# Patient Record
Sex: MALE | Race: WHITE | NOT HISPANIC OR LATINO | Employment: OTHER | URBAN - METROPOLITAN AREA
[De-identification: names, ages, dates, MRNs, and addresses within clinical notes are randomized per-mention and may not be internally consistent; named-entity substitution may affect disease eponyms.]

---

## 2017-01-17 PROBLEM — Z96.641 STATUS POST TOTAL REPLACEMENT OF RIGHT HIP: Status: ACTIVE | Noted: 2017-01-17

## 2017-02-10 PROBLEM — M25.552 LEFT HIP PAIN: Status: ACTIVE | Noted: 2017-02-10

## 2017-02-10 PROBLEM — M16.12 PRIMARY OSTEOARTHRITIS OF LEFT HIP: Status: ACTIVE | Noted: 2017-02-10

## 2017-02-10 PROBLEM — M47.817 SPONDYLOSIS OF LUMBOSACRAL REGION WITHOUT MYELOPATHY OR RADICULOPATHY: Status: ACTIVE | Noted: 2017-02-10

## 2017-03-30 PROBLEM — E11.9 DIABETES MELLITUS WITHOUT OPHTHALMIC MANIFESTATIONS (HCC): Status: ACTIVE | Noted: 2017-03-30

## 2018-06-13 PROBLEM — Z98.890 S/P CORONARY ANGIOGRAM: Status: ACTIVE | Noted: 2018-06-13

## 2018-09-26 PROBLEM — C44.91 BASAL CELL CARCINOMA, UNSPECIFIED SITE: Status: ACTIVE | Noted: 2018-09-26

## 2019-05-03 PROBLEM — C44.91 BASAL CELL CARCINOMA, UNSPECIFIED SITE: Status: RESOLVED | Noted: 2018-09-26 | Resolved: 2019-05-03

## 2019-11-08 PROBLEM — Z12.5 SCREENING FOR PROSTATE CANCER: Status: ACTIVE | Noted: 2019-11-08

## 2020-02-07 PROBLEM — Z86.73 HISTORY OF CVA (CEREBROVASCULAR ACCIDENT): Status: ACTIVE | Noted: 2020-02-07

## 2020-05-08 PROBLEM — F32.0 CURRENT MILD EPISODE OF MAJOR DEPRESSIVE DISORDER WITHOUT PRIOR EPISODE (HCC): Status: ACTIVE | Noted: 2020-05-08

## 2020-10-14 PROBLEM — T84.84XA PAINFUL TOTAL KNEE REPLACEMENT, LEFT: Status: ACTIVE | Noted: 2020-10-14

## 2020-10-14 PROBLEM — Z96.652 PAINFUL TOTAL KNEE REPLACEMENT, LEFT: Status: ACTIVE | Noted: 2020-10-14

## 2020-10-14 PROBLEM — T84.84XA PAINFUL TOTAL KNEE REPLACEMENT, LEFT (HCC): Status: ACTIVE | Noted: 2020-10-14

## 2020-10-14 PROBLEM — Z96.652 PAINFUL TOTAL KNEE REPLACEMENT, LEFT (HCC): Status: ACTIVE | Noted: 2020-10-14

## 2021-02-22 PROBLEM — I69.359 HEMIPLEGIA, DOMINANT SIDE S/P CVA (CEREBROVASCULAR ACCIDENT) (HCC): Status: ACTIVE | Noted: 2021-02-22

## 2021-06-28 ENCOUNTER — APPOINTMENT (OUTPATIENT)
Dept: CT IMAGING | Age: 76
DRG: 175 | End: 2021-06-28
Attending: EMERGENCY MEDICINE

## 2021-06-28 ENCOUNTER — APPOINTMENT (OUTPATIENT)
Dept: GENERAL RADIOLOGY | Age: 76
DRG: 175 | End: 2021-06-28
Attending: EMERGENCY MEDICINE

## 2021-06-28 ENCOUNTER — HOSPITAL ENCOUNTER (INPATIENT)
Age: 76
LOS: 1 days | Discharge: HOME | DRG: 175 | End: 2021-06-29
Attending: EMERGENCY MEDICINE | Admitting: HOSPITALIST

## 2021-06-28 DIAGNOSIS — R07.89 LEFT-SIDED CHEST WALL PAIN: ICD-10-CM

## 2021-06-28 DIAGNOSIS — I26.99 BILATERAL PULMONARY EMBOLISM (CMD): ICD-10-CM

## 2021-06-28 DIAGNOSIS — I48.91 NEW ONSET ATRIAL FIBRILLATION (CMD): ICD-10-CM

## 2021-06-28 DIAGNOSIS — I26.99 PULMONARY EMBOLISM AND INFARCTION (CMD): ICD-10-CM

## 2021-06-28 DIAGNOSIS — J18.9 PNEUMONIA OF RIGHT MIDDLE LOBE DUE TO INFECTIOUS ORGANISM: ICD-10-CM

## 2021-06-28 DIAGNOSIS — R07.81 PLEURITIC CHEST PAIN: Primary | ICD-10-CM

## 2021-06-28 PROBLEM — Z96.641 STATUS POST TOTAL REPLACEMENT OF RIGHT HIP: Status: ACTIVE | Noted: 2017-01-17

## 2021-06-28 PROBLEM — F32.0 CURRENT MILD EPISODE OF MAJOR DEPRESSIVE DISORDER WITHOUT PRIOR EPISODE (CMD): Status: ACTIVE | Noted: 2020-05-08

## 2021-06-28 PROBLEM — Z86.73 HISTORY OF CVA (CEREBROVASCULAR ACCIDENT): Status: ACTIVE | Noted: 2020-02-07

## 2021-06-28 PROBLEM — Z98.890 S/P CORONARY ANGIOGRAM: Status: ACTIVE | Noted: 2018-06-13

## 2021-06-28 PROBLEM — Z96.652: Status: ACTIVE | Noted: 2020-10-14

## 2021-06-28 PROBLEM — Z12.5 SCREENING FOR PROSTATE CANCER: Status: ACTIVE | Noted: 2019-11-08

## 2021-06-28 PROBLEM — I69.359 HEMIPLEGIA, DOMINANT SIDE S/P CVA (CEREBROVASCULAR ACCIDENT) (CMD): Status: ACTIVE | Noted: 2021-02-22

## 2021-06-28 PROBLEM — M25.552 LEFT HIP PAIN: Status: ACTIVE | Noted: 2017-02-10

## 2021-06-28 PROBLEM — T84.84XA: Status: ACTIVE | Noted: 2020-10-14

## 2021-06-28 LAB
ALBUMIN SERPL-MCNC: 3.1 G/DL (ref 3.6–5.1)
ALBUMIN/GLOB SERPL: 0.8 {RATIO} (ref 1–2.4)
ALP SERPL-CCNC: 92 UNITS/L (ref 45–117)
ALT SERPL-CCNC: 30 UNITS/L
ANION GAP SERPL CALC-SCNC: 8 MMOL/L (ref 10–20)
APTT PPP: 114 SEC (ref 22–30)
AST SERPL-CCNC: 15 UNITS/L
ATRIAL RATE (BPM): 104
BASOPHILS # BLD: 0.1 K/MCL (ref 0–0.3)
BASOPHILS NFR BLD: 1 %
BILIRUB SERPL-MCNC: 1 MG/DL (ref 0.2–1)
BUN SERPL-MCNC: 16 MG/DL (ref 6–20)
BUN/CREAT SERPL: 22 (ref 7–25)
CALCIUM SERPL-MCNC: 8.9 MG/DL (ref 8.4–10.2)
CHLORIDE SERPL-SCNC: 112 MMOL/L (ref 98–107)
CO2 SERPL-SCNC: 26 MMOL/L (ref 21–32)
CREAT SERPL-MCNC: 0.74 MG/DL (ref 0.67–1.17)
D DIMER PPP FEU-MCNC: 2.71 MG/L (FEU)
DEPRECATED RDW RBC: 45.3 FL (ref 39–50)
EOSINOPHIL # BLD: 0.2 K/MCL (ref 0–0.5)
EOSINOPHIL NFR BLD: 2 %
ERYTHROCYTE [DISTWIDTH] IN BLOOD: 13.3 % (ref 11–15)
FASTING DURATION TIME PATIENT: ABNORMAL H
GFR SERPLBLD BASED ON 1.73 SQ M-ARVRAT: 90 ML/MIN/1.73M2
GLOBULIN SER-MCNC: 3.7 G/DL (ref 2–4)
GLUCOSE SERPL-MCNC: 165 MG/DL (ref 65–99)
HCT VFR BLD CALC: 39 % (ref 39–51)
HGB BLD-MCNC: 13.2 G/DL (ref 13–17)
IMM GRANULOCYTES # BLD AUTO: 0 K/MCL (ref 0–0.2)
IMM GRANULOCYTES # BLD: 0 %
INR PPP: 1.1
LIPASE SERPL-CCNC: <50 UNITS/L (ref 73–393)
LYMPHOCYTES # BLD: 0.8 K/MCL (ref 1–4)
LYMPHOCYTES NFR BLD: 8 %
MAGNESIUM SERPL-MCNC: 1.6 MG/DL (ref 1.7–2.4)
MCH RBC QN AUTO: 30.9 PG (ref 26–34)
MCHC RBC AUTO-ENTMCNC: 33.8 G/DL (ref 32–36.5)
MCV RBC AUTO: 91.3 FL (ref 78–100)
MONOCYTES # BLD: 0.8 K/MCL (ref 0.3–0.9)
MONOCYTES NFR BLD: 8 %
NEUTROPHILS # BLD: 8.3 K/MCL (ref 1.8–7.7)
NEUTROPHILS NFR BLD: 81 %
NRBC BLD MANUAL-RTO: 0 /100 WBC
NT-PROBNP SERPL-MCNC: 910 PG/ML
PLATELET # BLD AUTO: 147 K/MCL (ref 140–450)
POTASSIUM SERPL-SCNC: 4.3 MMOL/L (ref 3.4–5.1)
PROCALCITONIN SERPL IA-MCNC: 0.1 NG/ML
PROT SERPL-MCNC: 6.8 G/DL (ref 6.4–8.2)
PROTHROMBIN TIME: 11.7 SEC (ref 9.7–11.8)
QRS-INTERVAL (MSEC): 134
QT-INTERVAL (MSEC): 372
QTC: 480
R AXIS (DEGREES): 36
RAINBOW EXTRA TUBES HOLD SPECIMEN: NORMAL
RBC # BLD: 4.27 MIL/MCL (ref 4.5–5.9)
REPORT TEXT: NORMAL
SODIUM SERPL-SCNC: 142 MMOL/L (ref 135–145)
T AXIS (DEGREES): 19
TROPONIN I SERPL HS-MCNC: <0.02 NG/ML
VENTRICULAR RATE EKG/MIN (BPM): 100
WBC # BLD: 10.1 K/MCL (ref 4.2–11)

## 2021-06-28 PROCEDURE — 10006031 HB ROOM CHARGE TELEMETRY

## 2021-06-28 PROCEDURE — 71045 X-RAY EXAM CHEST 1 VIEW: CPT

## 2021-06-28 PROCEDURE — 99285 EMERGENCY DEPT VISIT HI MDM: CPT

## 2021-06-28 PROCEDURE — 71275 CT ANGIOGRAPHY CHEST: CPT

## 2021-06-28 PROCEDURE — 83880 ASSAY OF NATRIURETIC PEPTIDE: CPT | Performed by: EMERGENCY MEDICINE

## 2021-06-28 PROCEDURE — 85025 COMPLETE CBC W/AUTO DIFF WBC: CPT | Performed by: EMERGENCY MEDICINE

## 2021-06-28 PROCEDURE — 87040 BLOOD CULTURE FOR BACTERIA: CPT | Performed by: EMERGENCY MEDICINE

## 2021-06-28 PROCEDURE — 83690 ASSAY OF LIPASE: CPT | Performed by: EMERGENCY MEDICINE

## 2021-06-28 PROCEDURE — 10002807 HB RX 258: Performed by: EMERGENCY MEDICINE

## 2021-06-28 PROCEDURE — 10002800 HB RX 250 W HCPCS: Performed by: EMERGENCY MEDICINE

## 2021-06-28 PROCEDURE — 96374 THER/PROPH/DIAG INJ IV PUSH: CPT

## 2021-06-28 PROCEDURE — 84484 ASSAY OF TROPONIN QUANT: CPT | Performed by: EMERGENCY MEDICINE

## 2021-06-28 PROCEDURE — 85730 THROMBOPLASTIN TIME PARTIAL: CPT | Performed by: EMERGENCY MEDICINE

## 2021-06-28 PROCEDURE — 93005 ELECTROCARDIOGRAM TRACING: CPT | Performed by: EMERGENCY MEDICINE

## 2021-06-28 PROCEDURE — 85610 PROTHROMBIN TIME: CPT | Performed by: EMERGENCY MEDICINE

## 2021-06-28 PROCEDURE — 10002805 HB CONTRAST AGENT: Performed by: EMERGENCY MEDICINE

## 2021-06-28 PROCEDURE — 83735 ASSAY OF MAGNESIUM: CPT | Performed by: EMERGENCY MEDICINE

## 2021-06-28 PROCEDURE — 96375 TX/PRO/DX INJ NEW DRUG ADDON: CPT

## 2021-06-28 PROCEDURE — 10002803 HB RX 637: Performed by: HOSPITALIST

## 2021-06-28 PROCEDURE — 10004651 HB RX, NO CHARGE ITEM: Performed by: HOSPITALIST

## 2021-06-28 PROCEDURE — 10002803 HB RX 637: Performed by: EMERGENCY MEDICINE

## 2021-06-28 PROCEDURE — 84145 PROCALCITONIN (PCT): CPT | Performed by: EMERGENCY MEDICINE

## 2021-06-28 PROCEDURE — 85379 FIBRIN DEGRADATION QUANT: CPT | Performed by: EMERGENCY MEDICINE

## 2021-06-28 PROCEDURE — 80053 COMPREHEN METABOLIC PANEL: CPT | Performed by: EMERGENCY MEDICINE

## 2021-06-28 RX ORDER — HEPARIN SODIUM 1000 [USP'U]/ML
80 INJECTION, SOLUTION INTRAVENOUS; SUBCUTANEOUS ONCE
Status: COMPLETED | OUTPATIENT
Start: 2021-06-28 | End: 2021-06-28

## 2021-06-28 RX ORDER — HEPARIN SODIUM 10000 [USP'U]/100ML
0-40 INJECTION, SOLUTION INTRAVENOUS CONTINUOUS
Status: DISCONTINUED | OUTPATIENT
Start: 2021-06-28 | End: 2021-06-29 | Stop reason: HOSPADM

## 2021-06-28 RX ORDER — PANTOPRAZOLE SODIUM 40 MG/1
40 TABLET, DELAYED RELEASE ORAL
Status: DISCONTINUED | OUTPATIENT
Start: 2021-06-29 | End: 2021-06-29 | Stop reason: HOSPADM

## 2021-06-28 RX ORDER — ONDANSETRON 2 MG/ML
4 INJECTION INTRAMUSCULAR; INTRAVENOUS ONCE
Status: COMPLETED | OUTPATIENT
Start: 2021-06-28 | End: 2021-06-28

## 2021-06-28 RX ORDER — ASPIRIN 81 MG/1
324 TABLET, CHEWABLE ORAL ONCE
Status: DISCONTINUED | OUTPATIENT
Start: 2021-06-28 | End: 2021-06-28 | Stop reason: CLARIF

## 2021-06-28 RX ORDER — ACETAMINOPHEN 325 MG/1
650 TABLET ORAL PRN
COMMUNITY
Start: 2019-12-03

## 2021-06-28 RX ORDER — MULTIVITAMIN,THER AND MINERALS
1 TABLET ORAL DAILY
COMMUNITY

## 2021-06-28 RX ORDER — HEPARIN SODIUM 1000 [USP'U]/ML
40 INJECTION, SOLUTION INTRAVENOUS; SUBCUTANEOUS PRN
Status: DISCONTINUED | OUTPATIENT
Start: 2021-06-28 | End: 2021-06-29 | Stop reason: HOSPADM

## 2021-06-28 RX ORDER — LANOLIN ALCOHOL/MO/W.PET/CERES
800 CREAM (GRAM) TOPICAL ONCE
Status: COMPLETED | OUTPATIENT
Start: 2021-06-28 | End: 2021-06-28

## 2021-06-28 RX ORDER — CLOPIDOGREL BISULFATE 75 MG/1
75 TABLET ORAL DAILY
COMMUNITY
Start: 2021-05-15

## 2021-06-28 RX ORDER — CLOPIDOGREL BISULFATE 75 MG/1
75 TABLET ORAL DAILY
Status: DISCONTINUED | OUTPATIENT
Start: 2021-06-29 | End: 2021-06-29 | Stop reason: HOSPADM

## 2021-06-28 RX ORDER — LOSARTAN POTASSIUM 25 MG/1
25 TABLET ORAL DAILY
COMMUNITY
Start: 2021-05-15

## 2021-06-28 RX ORDER — ONDANSETRON 2 MG/ML
4 INJECTION INTRAMUSCULAR; INTRAVENOUS EVERY 6 HOURS PRN
Status: DISCONTINUED | OUTPATIENT
Start: 2021-06-28 | End: 2021-06-29 | Stop reason: HOSPADM

## 2021-06-28 RX ORDER — TRAMADOL HYDROCHLORIDE 50 MG/1
25 TABLET ORAL
COMMUNITY
Start: 2020-10-12 | End: 2021-06-28

## 2021-06-28 RX ORDER — ATORVASTATIN CALCIUM 40 MG/1
40 TABLET, FILM COATED ORAL AT BEDTIME
Status: DISCONTINUED | OUTPATIENT
Start: 2021-06-28 | End: 2021-06-29 | Stop reason: HOSPADM

## 2021-06-28 RX ORDER — ESCITALOPRAM OXALATE 10 MG/1
20 TABLET ORAL DAILY
Status: DISCONTINUED | OUTPATIENT
Start: 2021-06-29 | End: 2021-06-29 | Stop reason: HOSPADM

## 2021-06-28 RX ORDER — ESCITALOPRAM OXALATE 20 MG/1
20 TABLET ORAL DAILY
COMMUNITY
Start: 2021-05-28

## 2021-06-28 RX ORDER — LOSARTAN POTASSIUM 25 MG/1
25 TABLET ORAL DAILY
Status: DISCONTINUED | OUTPATIENT
Start: 2021-06-29 | End: 2021-06-29 | Stop reason: HOSPADM

## 2021-06-28 RX ORDER — HEPARIN SODIUM 1000 [USP'U]/ML
80 INJECTION, SOLUTION INTRAVENOUS; SUBCUTANEOUS PRN
Status: DISCONTINUED | OUTPATIENT
Start: 2021-06-28 | End: 2021-06-29 | Stop reason: HOSPADM

## 2021-06-28 RX ORDER — ATORVASTATIN CALCIUM 40 MG/1
40 TABLET, FILM COATED ORAL AT BEDTIME
COMMUNITY
Start: 2021-04-09

## 2021-06-28 RX ORDER — ACETAMINOPHEN 325 MG/1
650 TABLET ORAL EVERY 4 HOURS PRN
Status: DISCONTINUED | OUTPATIENT
Start: 2021-06-28 | End: 2021-06-29 | Stop reason: HOSPADM

## 2021-06-28 RX ADMIN — ONDANSETRON 4 MG: 2 INJECTION INTRAMUSCULAR; INTRAVENOUS at 14:54

## 2021-06-28 RX ADMIN — HEPARIN SODIUM 8000 UNITS: 1000 INJECTION, SOLUTION INTRAVENOUS; SUBCUTANEOUS at 18:32

## 2021-06-28 RX ADMIN — MORPHINE SULFATE 4 MG: 4 INJECTION, SOLUTION INTRAMUSCULAR; INTRAVENOUS at 14:54

## 2021-06-28 RX ADMIN — CEFEPIME HYDROCHLORIDE 2000 MG: 2 INJECTION, POWDER, FOR SOLUTION INTRAVENOUS at 18:34

## 2021-06-28 RX ADMIN — HEPARIN SODIUM 18 UNITS/KG/HR: 10000 INJECTION, SOLUTION INTRAVENOUS at 18:31

## 2021-06-28 RX ADMIN — Medication 800 MG: at 14:54

## 2021-06-28 RX ADMIN — IOHEXOL 70 ML: 350 INJECTION, SOLUTION INTRAVENOUS at 16:47

## 2021-06-28 RX ADMIN — ACETAMINOPHEN 650 MG: 325 TABLET ORAL at 23:36

## 2021-06-28 RX ADMIN — DESMOPRESSIN ACETATE 40 MG: 0.2 TABLET ORAL at 23:36

## 2021-06-28 ASSESSMENT — ENCOUNTER SYMPTOMS
GASTROINTESTINAL NEGATIVE: 1
EYES NEGATIVE: 1
NERVOUS/ANXIOUS: 1
CONSTITUTIONAL NEGATIVE: 1
HEMATOLOGIC/LYMPHATIC NEGATIVE: 1
ALLERGIC/IMMUNOLOGIC NEGATIVE: 1
NEUROLOGICAL NEGATIVE: 1
COUGH: 0
SHORTNESS OF BREATH: 0
ENDOCRINE NEGATIVE: 1

## 2021-06-28 ASSESSMENT — PAIN SCALES - GENERAL
PAINLEVEL_OUTOF10: 0
PAINLEVEL_OUTOF10: 5

## 2021-06-28 ASSESSMENT — LIFESTYLE VARIABLES
HOW MANY STANDARD DRINKS CONTAINING ALCOHOL DO YOU HAVE ON A TYPICAL DAY: 0,1 OR 2
HOW OFTEN DO YOU HAVE A DRINK CONTAINING ALCOHOL: NEVER
ALCOHOL_USE_STATUS: NO OR LOW RISK WITH VALIDATED TOOL
AUDIT-C TOTAL SCORE: 0
HOW OFTEN DO YOU HAVE 6 OR MORE DRINKS ON ONE OCCASION: NEVER

## 2021-06-28 ASSESSMENT — ACTIVITIES OF DAILY LIVING (ADL)
TOILETING: INDEPENDENT
CONTINENCE: INDEPENDENT
FEEDING YOURSELF: INDEPENDENT
CHRONIC_PAIN_PRESENT: NO
DRESSING YOURSELF: INDEPENDENT
RECENT_DECLINE_ADL: NO
ADL_SHORT_OF_BREATH: YES
BATHING: INDEPENDENT
ADL_BEFORE_ADMISSION: INDEPENDENT
TRANSFERRING: INDEPENDENT
ADL_SCORE: 24

## 2021-06-28 ASSESSMENT — COGNITIVE AND FUNCTIONAL STATUS - GENERAL
ARE YOU BLIND OR DO YOU HAVE SERIOUS DIFFICULTY SEEING, EVEN WHEN WEARING GLASSES: NO
DO YOU HAVE DIFFICULTY DRESSING OR BATHING: NO
DO YOU HAVE SERIOUS DIFFICULTY WALKING OR CLIMBING STAIRS: YES
BECAUSE OF A PHYSICAL, MENTAL, OR EMOTIONAL CONDITION, DO YOU HAVE SERIOUS DIFFICULTY CONCENTRATING, REMEMBERING OR MAKING DECISIONS: NO
ARE YOU DEAF OR DO YOU HAVE SERIOUS DIFFICULTY  HEARING: NO
BECAUSE OF A PHYSICAL, MENTAL, OR EMOTIONAL CONDITION, DO YOU HAVE DIFFICULTY DOING ERRANDS ALONE: YES

## 2021-06-29 ENCOUNTER — APPOINTMENT (OUTPATIENT)
Dept: CARDIOLOGY | Age: 76
DRG: 175 | End: 2021-06-29
Attending: HOSPITALIST

## 2021-06-29 ENCOUNTER — APPOINTMENT (OUTPATIENT)
Dept: CARDIOLOGY | Age: 76
DRG: 175 | End: 2021-06-29
Attending: INTERNAL MEDICINE

## 2021-06-29 VITALS
BODY MASS INDEX: 27.17 KG/M2 | SYSTOLIC BLOOD PRESSURE: 139 MMHG | HEIGHT: 76 IN | OXYGEN SATURATION: 96 % | WEIGHT: 223.11 LBS | HEART RATE: 89 BPM | RESPIRATION RATE: 16 BRPM | DIASTOLIC BLOOD PRESSURE: 81 MMHG | TEMPERATURE: 99.5 F

## 2021-06-29 LAB
ANION GAP SERPL CALC-SCNC: 9 MMOL/L (ref 10–20)
APTT PPP: 38 SEC (ref 22–30)
APTT PPP: 47 SEC (ref 22–30)
APTT PPP: 47 SEC (ref 22–30)
APTT PPP: 87 SEC (ref 22–30)
BUN SERPL-MCNC: 14 MG/DL (ref 6–20)
BUN/CREAT SERPL: 23 (ref 7–25)
CALCIUM SERPL-MCNC: 8.8 MG/DL (ref 8.4–10.2)
CHLORIDE SERPL-SCNC: 112 MMOL/L (ref 98–107)
CO2 SERPL-SCNC: 24 MMOL/L (ref 21–32)
CREAT SERPL-MCNC: 0.6 MG/DL (ref 0.67–1.17)
DEPRECATED RDW RBC: 44.1 FL (ref 39–50)
ERYTHROCYTE [DISTWIDTH] IN BLOOD: 13.2 % (ref 11–15)
FASTING DURATION TIME PATIENT: ABNORMAL H
GFR SERPLBLD BASED ON 1.73 SQ M-ARVRAT: >90 ML/MIN/1.73M2
GLUCOSE SERPL-MCNC: 143 MG/DL (ref 65–99)
HCT VFR BLD CALC: 38.8 % (ref 39–51)
HGB BLD-MCNC: 12.9 G/DL (ref 13–17)
MCH RBC QN AUTO: 30.4 PG (ref 26–34)
MCHC RBC AUTO-ENTMCNC: 33.2 G/DL (ref 32–36.5)
MCV RBC AUTO: 91.5 FL (ref 78–100)
NRBC BLD MANUAL-RTO: 0 /100 WBC
PLATELET # BLD AUTO: 143 K/MCL (ref 140–450)
POTASSIUM SERPL-SCNC: 4.1 MMOL/L (ref 3.4–5.1)
PROCALCITONIN SERPL IA-MCNC: 0.11 NG/ML
RBC # BLD: 4.24 MIL/MCL (ref 4.5–5.9)
SODIUM SERPL-SCNC: 141 MMOL/L (ref 135–145)
WBC # BLD: 8.2 K/MCL (ref 4.2–11)

## 2021-06-29 PROCEDURE — 10002803 HB RX 637: Performed by: HOSPITALIST

## 2021-06-29 PROCEDURE — 85027 COMPLETE CBC AUTOMATED: CPT | Performed by: EMERGENCY MEDICINE

## 2021-06-29 PROCEDURE — 10002800 HB RX 250 W HCPCS: Performed by: EMERGENCY MEDICINE

## 2021-06-29 PROCEDURE — 36415 COLL VENOUS BLD VENIPUNCTURE: CPT | Performed by: HOSPITALIST

## 2021-06-29 PROCEDURE — 10004651 HB RX, NO CHARGE ITEM: Performed by: HOSPITALIST

## 2021-06-29 PROCEDURE — 93306 TTE W/DOPPLER COMPLETE: CPT

## 2021-06-29 PROCEDURE — 10002807 HB RX 258: Performed by: HOSPITALIST

## 2021-06-29 PROCEDURE — 85730 THROMBOPLASTIN TIME PARTIAL: CPT | Performed by: HOSPITALIST

## 2021-06-29 PROCEDURE — 80048 BASIC METABOLIC PNL TOTAL CA: CPT | Performed by: HOSPITALIST

## 2021-06-29 PROCEDURE — 84145 PROCALCITONIN (PCT): CPT | Performed by: HOSPITALIST

## 2021-06-29 PROCEDURE — 10002800 HB RX 250 W HCPCS: Performed by: HOSPITALIST

## 2021-06-29 PROCEDURE — 85730 THROMBOPLASTIN TIME PARTIAL: CPT | Performed by: EMERGENCY MEDICINE

## 2021-06-29 PROCEDURE — 93225 XTRNL ECG REC<48 HRS REC: CPT

## 2021-06-29 RX ORDER — AMOXICILLIN AND CLAVULANATE POTASSIUM 500; 125 MG/1; MG/1
1 TABLET, FILM COATED ORAL 2 TIMES DAILY
Qty: 16 TABLET | Refills: 0 | Status: SHIPPED | OUTPATIENT
Start: 2021-06-29 | End: 2021-07-07

## 2021-06-29 RX ADMIN — ACETAMINOPHEN 650 MG: 325 TABLET ORAL at 15:36

## 2021-06-29 RX ADMIN — ESCITALOPRAM OXALATE 20 MG: 10 TABLET ORAL at 09:45

## 2021-06-29 RX ADMIN — HEPARIN SODIUM 4000 UNITS: 1000 INJECTION, SOLUTION INTRAVENOUS; SUBCUTANEOUS at 09:46

## 2021-06-29 RX ADMIN — CEFEPIME HYDROCHLORIDE 1000 MG: 1 INJECTION, POWDER, FOR SOLUTION INTRAMUSCULAR; INTRAVENOUS at 06:25

## 2021-06-29 RX ADMIN — CEFEPIME HYDROCHLORIDE 1000 MG: 1 INJECTION, POWDER, FOR SOLUTION INTRAMUSCULAR; INTRAVENOUS at 15:00

## 2021-06-29 RX ADMIN — HEPARIN SODIUM 17 UNITS/KG/HR: 10000 INJECTION, SOLUTION INTRAVENOUS at 09:47

## 2021-06-29 RX ADMIN — HEPARIN SODIUM 17 UNITS/KG/HR: 10000 INJECTION, SOLUTION INTRAVENOUS at 14:32

## 2021-06-29 RX ADMIN — CLOPIDOGREL BISULFATE 75 MG: 75 TABLET, FILM COATED ORAL at 09:45

## 2021-06-29 RX ADMIN — LOSARTAN POTASSIUM 25 MG: 25 TABLET, FILM COATED ORAL at 09:45

## 2021-06-29 RX ADMIN — PANTOPRAZOLE SODIUM 40 MG: 40 TABLET, DELAYED RELEASE ORAL at 06:22

## 2021-06-29 RX ADMIN — CEFEPIME HYDROCHLORIDE 1000 MG: 1 INJECTION, POWDER, FOR SOLUTION INTRAMUSCULAR; INTRAVENOUS at 01:11

## 2021-06-29 ASSESSMENT — PAIN SCALES - GENERAL
PAINLEVEL_OUTOF10: 0
PAINLEVEL_OUTOF10: 0
PAINLEVEL_OUTOF10: 8

## 2021-07-03 LAB
BACTERIA BLD CULT: NORMAL
BACTERIA BLD CULT: NORMAL

## 2021-07-19 PROBLEM — D69.6 PLATELETS DECREASED (HCC): Status: ACTIVE | Noted: 2021-07-19

## 2021-08-02 PROBLEM — M12.812 LEFT ROTATOR CUFF TEAR ARTHROPATHY: Status: ACTIVE | Noted: 2021-08-02

## 2021-08-02 PROBLEM — M75.102 LEFT ROTATOR CUFF TEAR ARTHROPATHY: Status: ACTIVE | Noted: 2021-08-02

## 2021-08-20 ENCOUNTER — LABORATORY ENCOUNTER (OUTPATIENT)
Dept: LAB | Age: 76
End: 2021-08-20
Attending: ORTHOPAEDIC SURGERY
Payer: MEDICARE

## 2021-08-20 DIAGNOSIS — M75.102 LEFT ROTATOR CUFF TEAR ARTHROPATHY: ICD-10-CM

## 2021-08-20 DIAGNOSIS — M12.812 LEFT ROTATOR CUFF TEAR ARTHROPATHY: ICD-10-CM

## 2021-08-20 LAB
ANTIBODY SCREEN: NEGATIVE
ERYTHROCYTE [DISTWIDTH] IN BLOOD BY AUTOMATED COUNT: 13.2 %
HCT VFR BLD AUTO: 41.3 %
HGB BLD-MCNC: 13.9 G/DL
MCH RBC QN AUTO: 31.4 PG (ref 26–34)
MCHC RBC AUTO-ENTMCNC: 33.7 G/DL (ref 31–37)
MCV RBC AUTO: 93.4 FL
PLATELET # BLD AUTO: 138 10(3)UL (ref 150–450)
RBC # BLD AUTO: 4.42 X10(6)UL
RH BLOOD TYPE: POSITIVE
WBC # BLD AUTO: 5.3 X10(3) UL (ref 4–11)

## 2021-08-20 PROCEDURE — 86900 BLOOD TYPING SEROLOGIC ABO: CPT

## 2021-08-20 PROCEDURE — 86901 BLOOD TYPING SEROLOGIC RH(D): CPT

## 2021-08-20 PROCEDURE — 36415 COLL VENOUS BLD VENIPUNCTURE: CPT

## 2021-08-20 PROCEDURE — 86850 RBC ANTIBODY SCREEN: CPT

## 2021-08-20 PROCEDURE — 87081 CULTURE SCREEN ONLY: CPT

## 2021-08-20 PROCEDURE — 85027 COMPLETE CBC AUTOMATED: CPT

## 2021-09-01 NOTE — H&P
[facility]  Orthopedic Surgery  HISTORY AND PHYSICAL EXAMINATION    Patient: Micheal Beltran  Medical Record Number: [id number]    CHIEF COMPLAINT: Left shoulder pain and weakness    HPI:   Genny Cunningham is a 76year old male followed in the office, jaymele Unspecified sleep apnea     after weight loss no longer needed, has not use cpap for several years.    • Visual impairment     readers      Social History:  Social History    Tobacco Use      Smoking status: Former Smoker        Types: Cigarettes        Juda Scheuermann risks, complications are discussed. Possibility of continued pain, stiffness, vascular compromise, incomplete resolution of symptoms is all understood. Complicated by chronic A. fib, anticoagulation status, history of CVA. Questions are answered.   Has h

## 2021-09-04 ENCOUNTER — LAB ENCOUNTER (OUTPATIENT)
Dept: LAB | Facility: HOSPITAL | Age: 76
End: 2021-09-04
Attending: ORTHOPAEDIC SURGERY
Payer: MEDICARE

## 2021-09-04 DIAGNOSIS — M75.102 LEFT ROTATOR CUFF TEAR ARTHROPATHY: ICD-10-CM

## 2021-09-04 DIAGNOSIS — M12.812 LEFT ROTATOR CUFF TEAR ARTHROPATHY: ICD-10-CM

## 2021-09-05 LAB — SARS-COV-2 RNA RESP QL NAA+PROBE: NOT DETECTED

## 2021-09-07 ENCOUNTER — ANESTHESIA EVENT (OUTPATIENT)
Dept: SURGERY | Facility: HOSPITAL | Age: 76
End: 2021-09-07
Payer: MEDICARE

## 2021-09-07 ENCOUNTER — HOSPITAL ENCOUNTER (OUTPATIENT)
Facility: HOSPITAL | Age: 76
Discharge: NURSING, REHAB OR LONG-TERM CARE | End: 2021-09-09
Attending: ORTHOPAEDIC SURGERY | Admitting: ORTHOPAEDIC SURGERY
Payer: MEDICARE

## 2021-09-07 ENCOUNTER — ANESTHESIA (OUTPATIENT)
Dept: SURGERY | Facility: HOSPITAL | Age: 76
End: 2021-09-07
Payer: MEDICARE

## 2021-09-07 ENCOUNTER — APPOINTMENT (OUTPATIENT)
Dept: GENERAL RADIOLOGY | Facility: HOSPITAL | Age: 76
End: 2021-09-07
Attending: ORTHOPAEDIC SURGERY
Payer: MEDICARE

## 2021-09-07 DIAGNOSIS — M12.812 LEFT ROTATOR CUFF TEAR ARTHROPATHY: Primary | ICD-10-CM

## 2021-09-07 DIAGNOSIS — Z98.890 S/P CORONARY ANGIOGRAM: ICD-10-CM

## 2021-09-07 DIAGNOSIS — M75.102 LEFT ROTATOR CUFF TEAR ARTHROPATHY: Primary | ICD-10-CM

## 2021-09-07 LAB — GLUCOSE BLD-MCNC: 187 MG/DL (ref 70–99)

## 2021-09-07 PROCEDURE — 73020 X-RAY EXAM OF SHOULDER: CPT | Performed by: ORTHOPAEDIC SURGERY

## 2021-09-07 PROCEDURE — 0RRK00Z REPLACEMENT OF LEFT SHOULDER JOINT WITH REVERSE BALL AND SOCKET SYNTHETIC SUBSTITUTE, OPEN APPROACH: ICD-10-PCS | Performed by: ORTHOPAEDIC SURGERY

## 2021-09-07 PROCEDURE — 82962 GLUCOSE BLOOD TEST: CPT

## 2021-09-07 PROCEDURE — 0PBD0ZZ EXCISION OF LEFT HUMERAL HEAD, OPEN APPROACH: ICD-10-PCS | Performed by: ORTHOPAEDIC SURGERY

## 2021-09-07 PROCEDURE — 88305 TISSUE EXAM BY PATHOLOGIST: CPT | Performed by: ORTHOPAEDIC SURGERY

## 2021-09-07 PROCEDURE — 88311 DECALCIFY TISSUE: CPT | Performed by: ORTHOPAEDIC SURGERY

## 2021-09-07 DEVICE — 24MM BASEPLATE,  20° FULL AUG, OB
Type: IMPLANTABLE DEVICE | Site: SHOULDER | Status: FUNCTIONAL
Brand: ARTHREX®

## 2021-09-07 DEVICE — HUMERAL INSERT M/39 +3 TO FIT IN 39 CUP
Type: IMPLANTABLE DEVICE | Site: SHOULDER | Status: FUNCTIONAL
Brand: ARTHREX®

## 2021-09-07 DEVICE — 5.5X36MM PERIPHERAL SCREW, LOCKING
Type: IMPLANTABLE DEVICE | Site: SHOULDER | Status: FUNCTIONAL
Brand: ARTHREX®

## 2021-09-07 DEVICE — UNIVERS REVERS SUTURE CUP, 39 (NEUTRAL)
Type: IMPLANTABLE DEVICE | Site: SHOULDER | Status: FUNCTIONAL
Brand: ARTHREX®

## 2021-09-07 DEVICE — MODULAR POST, 30MM
Type: IMPLANTABLE DEVICE | Site: SHOULDER | Status: FUNCTIONAL
Brand: ARTHREX®

## 2021-09-07 DEVICE — IMPLANTABLE DEVICE: Type: IMPLANTABLE DEVICE | Site: SHOULDER | Status: FUNCTIONAL

## 2021-09-07 DEVICE — UNIVERS REVERS SPACER 39+6MM
Type: IMPLANTABLE DEVICE | Site: SHOULDER | Status: FUNCTIONAL
Brand: ARTHREX®

## 2021-09-07 DEVICE — UNIVERS REVERS HUMERAL STEM, SIZE 7
Type: IMPLANTABLE DEVICE | Site: SHOULDER | Status: FUNCTIONAL
Brand: ARTHREX®

## 2021-09-07 DEVICE — 5.5X20MM PERIPHERAL SCREW, LOCKING
Type: IMPLANTABLE DEVICE | Site: SHOULDER | Status: FUNCTIONAL
Brand: ARTHREX®

## 2021-09-07 RX ORDER — INSULIN ASPART 100 [IU]/ML
INJECTION, SOLUTION INTRAVENOUS; SUBCUTANEOUS
Status: COMPLETED
Start: 2021-09-07 | End: 2021-09-07

## 2021-09-07 RX ORDER — DIPHENHYDRAMINE HYDROCHLORIDE 50 MG/ML
25 INJECTION INTRAMUSCULAR; INTRAVENOUS ONCE AS NEEDED
Status: ACTIVE | OUTPATIENT
Start: 2021-09-07 | End: 2021-09-07

## 2021-09-07 RX ORDER — HYDROCODONE BITARTRATE AND ACETAMINOPHEN 5; 325 MG/1; MG/1
2 TABLET ORAL AS NEEDED
Status: DISCONTINUED | OUTPATIENT
Start: 2021-09-07 | End: 2021-09-07 | Stop reason: HOSPADM

## 2021-09-07 RX ORDER — TRANEXAMIC ACID 10 MG/ML
1000 INJECTION, SOLUTION INTRAVENOUS ONCE
Status: DISCONTINUED | OUTPATIENT
Start: 2021-09-07 | End: 2021-09-07

## 2021-09-07 RX ORDER — BISACODYL 10 MG
10 SUPPOSITORY, RECTAL RECTAL
Status: DISCONTINUED | OUTPATIENT
Start: 2021-09-07 | End: 2021-09-09

## 2021-09-07 RX ORDER — HYDROCODONE BITARTRATE AND ACETAMINOPHEN 5; 325 MG/1; MG/1
2 TABLET ORAL EVERY 4 HOURS PRN
Status: DISCONTINUED | OUTPATIENT
Start: 2021-09-07 | End: 2021-09-09

## 2021-09-07 RX ORDER — ESCITALOPRAM OXALATE 20 MG/1
20 TABLET ORAL DAILY
Status: DISCONTINUED | OUTPATIENT
Start: 2021-09-07 | End: 2021-09-09

## 2021-09-07 RX ORDER — POLYETHYLENE GLYCOL 3350 17 G/17G
17 POWDER, FOR SOLUTION ORAL DAILY PRN
Status: DISCONTINUED | OUTPATIENT
Start: 2021-09-07 | End: 2021-09-09

## 2021-09-07 RX ORDER — INSULIN ASPART 100 [IU]/ML
INJECTION, SOLUTION INTRAVENOUS; SUBCUTANEOUS ONCE
Status: COMPLETED | OUTPATIENT
Start: 2021-09-07 | End: 2021-09-07

## 2021-09-07 RX ORDER — HYDROMORPHONE HYDROCHLORIDE 1 MG/ML
INJECTION, SOLUTION INTRAMUSCULAR; INTRAVENOUS; SUBCUTANEOUS
Status: COMPLETED
Start: 2021-09-07 | End: 2021-09-07

## 2021-09-07 RX ORDER — HYDROCODONE BITARTRATE AND ACETAMINOPHEN 5; 325 MG/1; MG/1
1 TABLET ORAL EVERY 4 HOURS PRN
Status: DISCONTINUED | OUTPATIENT
Start: 2021-09-07 | End: 2021-09-09

## 2021-09-07 RX ORDER — METOCLOPRAMIDE HYDROCHLORIDE 5 MG/ML
INJECTION INTRAMUSCULAR; INTRAVENOUS AS NEEDED
Status: DISCONTINUED | OUTPATIENT
Start: 2021-09-07 | End: 2021-09-07 | Stop reason: SURG

## 2021-09-07 RX ORDER — DEXTROSE MONOHYDRATE 25 G/50ML
50 INJECTION, SOLUTION INTRAVENOUS
Status: DISCONTINUED | OUTPATIENT
Start: 2021-09-07 | End: 2021-09-07 | Stop reason: HOSPADM

## 2021-09-07 RX ORDER — CEFAZOLIN SODIUM/WATER 2 G/20 ML
2 SYRINGE (ML) INTRAVENOUS ONCE
Status: COMPLETED | OUTPATIENT
Start: 2021-09-07 | End: 2021-09-07

## 2021-09-07 RX ORDER — ACETAMINOPHEN 325 MG/1
TABLET ORAL
Status: COMPLETED
Start: 2021-09-07 | End: 2021-09-07

## 2021-09-07 RX ORDER — SODIUM PHOSPHATE, DIBASIC AND SODIUM PHOSPHATE, MONOBASIC 7; 19 G/133ML; G/133ML
1 ENEMA RECTAL ONCE AS NEEDED
Status: DISCONTINUED | OUTPATIENT
Start: 2021-09-07 | End: 2021-09-09

## 2021-09-07 RX ORDER — HYDROCODONE BITARTRATE AND ACETAMINOPHEN 5; 325 MG/1; MG/1
1-2 TABLET ORAL EVERY 4 HOURS PRN
Status: DISCONTINUED | OUTPATIENT
Start: 2021-09-07 | End: 2021-09-07 | Stop reason: SDUPTHER

## 2021-09-07 RX ORDER — ACETAMINOPHEN 500 MG
1000 TABLET ORAL ONCE
Status: DISCONTINUED | OUTPATIENT
Start: 2021-09-07 | End: 2021-09-07 | Stop reason: HOSPADM

## 2021-09-07 RX ORDER — METOPROLOL TARTRATE 50 MG/1
50 TABLET, FILM COATED ORAL
Status: DISCONTINUED | OUTPATIENT
Start: 2021-09-07 | End: 2021-09-09

## 2021-09-07 RX ORDER — ASPIRIN 81 MG/1
81 TABLET, CHEWABLE ORAL DAILY
COMMUNITY
End: 2021-09-23

## 2021-09-07 RX ORDER — METOCLOPRAMIDE HYDROCHLORIDE 5 MG/ML
10 INJECTION INTRAMUSCULAR; INTRAVENOUS AS NEEDED
Status: DISCONTINUED | OUTPATIENT
Start: 2021-09-07 | End: 2021-09-07 | Stop reason: HOSPADM

## 2021-09-07 RX ORDER — ONDANSETRON 2 MG/ML
INJECTION INTRAMUSCULAR; INTRAVENOUS AS NEEDED
Status: DISCONTINUED | OUTPATIENT
Start: 2021-09-07 | End: 2021-09-07 | Stop reason: SURG

## 2021-09-07 RX ORDER — PHENYLEPHRINE HCL 10 MG/ML
VIAL (ML) INJECTION AS NEEDED
Status: DISCONTINUED | OUTPATIENT
Start: 2021-09-07 | End: 2021-09-07 | Stop reason: SURG

## 2021-09-07 RX ORDER — DEXAMETHASONE SODIUM PHOSPHATE 4 MG/ML
4 VIAL (ML) INJECTION AS NEEDED
Status: DISCONTINUED | OUTPATIENT
Start: 2021-09-07 | End: 2021-09-07 | Stop reason: HOSPADM

## 2021-09-07 RX ORDER — SODIUM CHLORIDE, SODIUM LACTATE, POTASSIUM CHLORIDE, CALCIUM CHLORIDE 600; 310; 30; 20 MG/100ML; MG/100ML; MG/100ML; MG/100ML
INJECTION, SOLUTION INTRAVENOUS CONTINUOUS
Status: DISCONTINUED | OUTPATIENT
Start: 2021-09-07 | End: 2021-09-07 | Stop reason: HOSPADM

## 2021-09-07 RX ORDER — PROCHLORPERAZINE EDISYLATE 5 MG/ML
10 INJECTION INTRAMUSCULAR; INTRAVENOUS EVERY 6 HOURS PRN
Status: ACTIVE | OUTPATIENT
Start: 2021-09-07 | End: 2021-09-09

## 2021-09-07 RX ORDER — NALOXONE HYDROCHLORIDE 0.4 MG/ML
80 INJECTION, SOLUTION INTRAMUSCULAR; INTRAVENOUS; SUBCUTANEOUS AS NEEDED
Status: DISCONTINUED | OUTPATIENT
Start: 2021-09-07 | End: 2021-09-07 | Stop reason: HOSPADM

## 2021-09-07 RX ORDER — DEXAMETHASONE SODIUM PHOSPHATE 4 MG/ML
VIAL (ML) INJECTION AS NEEDED
Status: DISCONTINUED | OUTPATIENT
Start: 2021-09-07 | End: 2021-09-07 | Stop reason: SURG

## 2021-09-07 RX ORDER — ZOLPIDEM TARTRATE 5 MG/1
5 TABLET ORAL NIGHTLY PRN
Status: DISCONTINUED | OUTPATIENT
Start: 2021-09-07 | End: 2021-09-09

## 2021-09-07 RX ORDER — HYDROMORPHONE HYDROCHLORIDE 1 MG/ML
0.4 INJECTION, SOLUTION INTRAMUSCULAR; INTRAVENOUS; SUBCUTANEOUS EVERY 5 MIN PRN
Status: DISCONTINUED | OUTPATIENT
Start: 2021-09-07 | End: 2021-09-07 | Stop reason: HOSPADM

## 2021-09-07 RX ORDER — CEFAZOLIN SODIUM/WATER 2 G/20 ML
2 SYRINGE (ML) INTRAVENOUS EVERY 8 HOURS
Status: COMPLETED | OUTPATIENT
Start: 2021-09-07 | End: 2021-09-08

## 2021-09-07 RX ORDER — DOCUSATE SODIUM 100 MG/1
100 CAPSULE, LIQUID FILLED ORAL 2 TIMES DAILY
Status: DISCONTINUED | OUTPATIENT
Start: 2021-09-07 | End: 2021-09-09

## 2021-09-07 RX ORDER — HYDROCODONE BITARTRATE AND ACETAMINOPHEN 5; 325 MG/1; MG/1
1 TABLET ORAL AS NEEDED
Status: DISCONTINUED | OUTPATIENT
Start: 2021-09-07 | End: 2021-09-07 | Stop reason: HOSPADM

## 2021-09-07 RX ORDER — ACETAMINOPHEN 325 MG/1
650 TABLET ORAL ONCE
Status: COMPLETED | OUTPATIENT
Start: 2021-09-07 | End: 2021-09-07

## 2021-09-07 RX ORDER — CEFAZOLIN SODIUM/WATER 2 G/20 ML
SYRINGE (ML) INTRAVENOUS
Status: DISPENSED
Start: 2021-09-07 | End: 2021-09-07

## 2021-09-07 RX ORDER — ROCURONIUM BROMIDE 10 MG/ML
INJECTION, SOLUTION INTRAVENOUS AS NEEDED
Status: DISCONTINUED | OUTPATIENT
Start: 2021-09-07 | End: 2021-09-07 | Stop reason: SURG

## 2021-09-07 RX ORDER — ONDANSETRON 2 MG/ML
4 INJECTION INTRAMUSCULAR; INTRAVENOUS EVERY 4 HOURS PRN
Status: DISCONTINUED | OUTPATIENT
Start: 2021-09-07 | End: 2021-09-09

## 2021-09-07 RX ORDER — ESCITALOPRAM OXALATE 20 MG/1
20 TABLET ORAL DAILY
COMMUNITY
End: 2021-11-22

## 2021-09-07 RX ORDER — ONDANSETRON 2 MG/ML
4 INJECTION INTRAMUSCULAR; INTRAVENOUS AS NEEDED
Status: DISCONTINUED | OUTPATIENT
Start: 2021-09-07 | End: 2021-09-07 | Stop reason: HOSPADM

## 2021-09-07 RX ORDER — ASPIRIN 81 MG/1
81 TABLET, CHEWABLE ORAL DAILY
Status: DISCONTINUED | OUTPATIENT
Start: 2021-09-08 | End: 2021-09-09

## 2021-09-07 RX ORDER — ATORVASTATIN CALCIUM 40 MG/1
40 TABLET, FILM COATED ORAL NIGHTLY
Status: DISCONTINUED | OUTPATIENT
Start: 2021-09-07 | End: 2021-09-09

## 2021-09-07 RX ORDER — SODIUM CHLORIDE 9 MG/ML
INJECTION, SOLUTION INTRAVENOUS CONTINUOUS
Status: DISCONTINUED | OUTPATIENT
Start: 2021-09-07 | End: 2021-09-09

## 2021-09-07 RX ADMIN — ROCURONIUM BROMIDE 50 MG: 10 INJECTION, SOLUTION INTRAVENOUS at 08:16:00

## 2021-09-07 RX ADMIN — METOCLOPRAMIDE HYDROCHLORIDE 10 MG: 5 INJECTION INTRAMUSCULAR; INTRAVENOUS at 08:16:00

## 2021-09-07 RX ADMIN — PHENYLEPHRINE HCL 100 MCG: 10 MG/ML VIAL (ML) INJECTION at 08:51:00

## 2021-09-07 RX ADMIN — PHENYLEPHRINE HCL 100 MCG: 10 MG/ML VIAL (ML) INJECTION at 08:40:00

## 2021-09-07 RX ADMIN — DEXAMETHASONE SODIUM PHOSPHATE 4 MG: 4 MG/ML VIAL (ML) INJECTION at 08:16:00

## 2021-09-07 RX ADMIN — ONDANSETRON 4 MG: 2 INJECTION INTRAMUSCULAR; INTRAVENOUS at 08:16:00

## 2021-09-07 RX ADMIN — CEFAZOLIN SODIUM/WATER 2 G: 2 G/20 ML SYRINGE (ML) INTRAVENOUS at 08:25:00

## 2021-09-07 NOTE — CONSULTS
General Medicine Consult      Consulted by: Juanna Leyden, MD    PCP: Afsaneh Chauhan DO    Reason for Consultation: Medical Management    History of Present Illness: Patient is a 76year old male with PMH including but not limited to PE, afib, depressio WND FACE,FACIAL 5.1-7.5  11/16/2012    Procedure: EXCISION OF FACE/HEAD/NECK LESION;  Surgeon: Cecilia Stover MD;  Location: [address] [place] [address]   • OTHER SURGICAL HISTORY  11/14    foot surgery   • PATIENT DOCUMENTED NOT TO HAVE EXPERIENCED ANY RIGHT PHACOEMULSIFICATION OF CATARACT WITH INTRAOCULAR LENS IMPLANT 36348;  Surgeon: Blanca Anderson MD;  Location: [address] [place] [address]   • [facility] CATARACT EXTRACAP,INSERT LENS Left 2/15/2016    Procedure: LEFT PHACOEMULSIFICATION OF CATARACT WITH INTRA Soft, NT/ND, Bowel sounds normal. No masses,  No organomegaly. Extremities: L Shoulder brace in place    Skin: Skin color, texture, turgor normal. No rashes or lesions.     Neurologic: Moving all extremities spontaneously, no focal deficit appreciated If clinically warranted this can be further evaluated with contrast-enhanced MRI of the brain. The visualized paranasal sinuses are clear. Orbits and globes demonstrate bilateral lens extractions. Nasopharynx: Normal Lymph Nodes: No Lymphadenopathy.  Palati without gadolinium would be more definitive. 3. Patulous esophagus. 4. Mild to moderate degenerative changes of the cervical spine with subchondral cystic changes of the dens.      XR SHOULDER, (1 VIEW), LEFT (CPT=73020)    Result Date: 9/7/2021  PROCEDURE: atelectasis;  Instructed on use of IS  - on 2L    # ANCELMO  -protocol    # Major Depression/ Generalized anxiety d/o   -reviewed home meds, continue SSRI currently appears stable     # CVA hx    # DVT proph  -resume when ok c ortho, d/w Dr. Randee Remy

## 2021-09-07 NOTE — OPERATIVE REPORT
[facility]    PATIENT'S NAME: Jasiel Raedr   ATTENDING PHYSICIAN: Keturah Terrell M.D. OPERATING PHYSICIAN: Keturah Terrell M.D.    PATIENT ACCOUNT#:   [de-identified]    LOCATION:  [address] [place] [address]  MEDICAL RECORD #:   [id number]       DATE OF BIRTH: Glenoid is exposed. Grade 3 and 4 changes seen diffusely. Capsule is mobilized off subscapularis. Axillary, musculocutaneous, and radial nerves are protected at all times.   VIP plan reveals -13.9 degrees of version corrected to -9 and 21.8 degrees of in

## 2021-09-07 NOTE — INTERVAL H&P NOTE
Pre-op Diagnosis: Left rotator cuff tear arthropathy [M75.102, M12.812]    The above referenced H&P was reviewed by MATTHEW Krishnamurthy on 9/7/2021, the patient was examined and no significant changes have occurred in the patient's condition since the H&P

## 2021-09-07 NOTE — BRIEF OP NOTE
Pre-Operative Diagnosis: Left rotator cuff tear arthropathy [M75.102, M12.812]     Post-Operative Diagnosis: * No post-op diagnosis entered *      Procedure Performed:   LEFT REVERSE SHOULDER ARTHROPLASTY    Surgeon(s) and Role:     * Bonnie Galeana MD

## 2021-09-07 NOTE — ANESTHESIA POSTPROCEDURE EVALUATION
[facility] Patient Status:  Outpatient in a Bed   Age/Gender 76year old male MRN [id number]   [facility] SURGERY Attending Kamryn Pedro MD   Hosp Day # 0 PCP Ceferino Matthews DO       Anesthesia Post-op Note    LEFT

## 2021-09-07 NOTE — ANESTHESIA PREPROCEDURE EVALUATION
PRE-OP EVALUATION    Patient Name: Alice Blank    Admit Diagnosis: Left rotator cuff tear arthropathy [M75.102, M12.812]    Pre-op Diagnosis: Left rotator cuff tear arthropathy [M75.102, M12.812]    LEFT REVERSE SHOULDER ARTHROPLASTY    Anesthesia Proce Potassium 25 MG Oral Tab, Take 1 tablet (25 mg total) by mouth daily. , Disp: 90 tablet, Rfl: 3, 9/6/2021 at 0800  Multiple Vitamins-Minerals (MULTIVITAL) Oral Tab, Take 1 tablet by mouth daily. , Disp: , Rfl: , 9/6/2021 at 1800  enoxaparin (LOVENOX) 100 MG/ 4.  The right coronary artery is a large anatomically codominant vessel   that gives off a PDA and PL.  The distal vessel is severely diffusely   diseased throughout its entire distal course                      (+) hypertension   (+) hyperlipidemia  (+) FACE/HEAD/NECK LESION;  Surgeon: Ingrid Lr MD;  Location: [address] [place] [address]   • PATIENT DOCUMENTED NOT TO HAVE EXPERIENCED ANY OF THE FOLLOWING EVENTS Left 2/15/2016    Procedure: LEFT PHACOEMULSIFICATION OF CATARACT WITH INTRAOCULAR LENS Location: [facility]   • SKIN SURGERY       Social History    Tobacco Use      Smoking status: Former Smoker        Types: Cigarettes        Quit date:         Years since quittin.7      Smokeless tobacco: Never Used    Alcohol use:

## 2021-09-07 NOTE — ANESTHESIA PROCEDURE NOTES
Airway  Date/Time: 9/7/2021 8:16 AM  Urgency: elective      General Information and Staff    Patient location during procedure: OR  Anesthesiologist: Ramez Jimenez MD  Performed: anesthesiologist     Indications and Patient Condition  Indications for airwa

## 2021-09-07 NOTE — PROGRESS NOTES
Pt is AOx4, drowsy this afternoon due to pain meds. VSS on 2L. Hx stroke. Anticoagulation to resume tomorrow. Diet advance as tolerated. Pt states he ambulates at home with occasional use of walker. DTV.  Pt has been updated on poc and all questions have be

## 2021-09-07 NOTE — PROGRESS NOTES
NURSING ADMISSION NOTE      Patient admitted via Cart  Oriented to room. Safety precautions initiated. Bed in low position. Call light in reach. Received pt from pacu. Pt drowsy and asleep.

## 2021-09-08 PROCEDURE — 97530 THERAPEUTIC ACTIVITIES: CPT

## 2021-09-08 PROCEDURE — 97162 PT EVAL MOD COMPLEX 30 MIN: CPT

## 2021-09-08 PROCEDURE — 97166 OT EVAL MOD COMPLEX 45 MIN: CPT

## 2021-09-08 PROCEDURE — 97535 SELF CARE MNGMENT TRAINING: CPT

## 2021-09-08 NOTE — PLAN OF CARE
Patient A&Ox4, VSS on 2L NC. POD 1 left shoulder. Immobilizer and gel ice to shoulder. Slept well throughout the night with no complaints of pain or discomfort. IV fluids continued. Plan for PT/OT to see and DC home. Plan of care reviewed with patient.

## 2021-09-08 NOTE — PROGRESS NOTES
[facility]  Orthopedic Surgery  Progress Note    Clayton Banks Patient Status:  Outpatient in a Bed    1945 MRN [id number]   [facility] 3SW-A Attending Marina Yip MD   Hosp Day # 0 [facility], DO     [address]

## 2021-09-08 NOTE — PHYSICAL THERAPY NOTE
PHYSICAL THERAPY EVALUATION - INPATIENT     Room Number: 359/359-A  Evaluation Date: 9/8/2021  Type of Evaluation: Initial  Physician Order: PT Eval and Treat    Presenting Problem: s/p L reverse shoulder arthroplasty   Reason for Therapy: Mobility D REPLACEMENT SURGERY  2003, 2004    Both knees replaced   • LAP ADJUSTABLE GASTRIC BAND  2007    Dr. Rosaura Freitas   • [facility] CLOS WND FACE,FACIAL 5.1-7.5  11/16/2012    Procedure: EXCISION OF FACE/HEAD/NECK LESION;  Surgeon: Kiara Marrero MD;  Location: [facility] PATIENT [place] PREOPERATIVE ORDER FOR IV ANTIBIOTIC SURGICAL SITE INFECTION PROPHYLAXIS.  Right 2/29/2016    Procedure: RIGHT PHACOEMULSIFICATION OF CATARACT WITH INTRAOCULAR LENS IMPLANT 62441;  Surgeon: Patrick Alberto MD;  Location: [address] [place] [address] Status:  Impaired  · Attention Span:  pt easily distracted during session, able to redirect with cues   · Memory:  impaired working memory  · Following Commands:  follows one step commands with increased time  · Safety Judgement:  decreased awareness of ne room?: A Little   -   Climbing 3-5 steps with a railing?: A Lot       AM-PAC Score:  Raw Score: 15   Approx Degree of Impairment: 57.7%   Standardized Score (AM-PAC Scale): 39.45   CMS Modifier (G-Code): CK    FUNCTIONAL ABILITY STATUS  Gait Assessment   G impairments of pain, decreased endurance, tolerance to activity, strength, and balance. Functional outcome measures completed include AMPAC with a score of 57.7% impairment which supports recommendation for RASHEEDA upon discharge.   Based on this evaluation, p

## 2021-09-08 NOTE — CM/SW NOTE
09/08/21 1400   CM/SW Referral Data   Referral Source Social Work (self-referral)   Reason for Referral Discharge planning   Informant EMR   Discharge Needs   Anticipated D/C needs Subacute rehab;Transportation services       HOME SITUATION  Type of Angel

## 2021-09-08 NOTE — PROGRESS NOTES
[facility] Hospitalist Progress Note     PCP: Ganesh Kelly DO    Chief Complaint: follow-up    Overnight/Interim Events:      SUBJECTIVE:  Sitting in chair. Some unsteadiness per RN but appears to be his baseline. Little pain. No n/v, hasn't walked yet.      O in the last 168 hours.       Meds:     • docusate sodium  100 mg Oral BID   • escitalopram  20 mg Oral Daily   • atorvastatin  40 mg Oral Nightly   • metoprolol tartrate  50 mg Oral 2x Daily(Beta Blocker)   • apixaban  5 mg Oral BID   • aspirin  81 mg Oral [place]     Dispo: cont ortho post-op care; lives c wife, ok dc today    Questions/concerns were discussed with patient by bedside.  D/w RN MD Cholo Villatoro  [phone]  9/8/2021  1:15 PM

## 2021-09-08 NOTE — PROGRESS NOTES
Anesthesia Pain Service    POD# 1 s/p TSA    No nerve block done   - minimal pain - sensation in fingertips at baseline      Plan discussed with/by: Ramiro Finnegan, CALISTA, and Anesthesia - Dr. Elaine Cameron

## 2021-09-08 NOTE — CM/SW NOTE
Received call from pt's wife. Discussed DC planning and therapy recommendations for RASHEEDA. Pt's wife agreeable with no preference in facility. Discussed referrals pending. SW to leave list of accepting facilities in pt's room today.   Wife will visit this

## 2021-09-08 NOTE — OCCUPATIONAL THERAPY NOTE
OCCUPATIONAL THERAPY EVALUATION - INPATIENT     Room Number: 359/359-A  Evaluation Date: 9/8/2021  Type of Evaluation: Initial  Presenting Problem: s/p L reverse TSA 9/7/21    Physician Order: IP Consult to Occupational Therapy  Reason for Therapy: ADL/IAD HIP REPLACEMENT SURGERY Left    • KNEE REPLACEMENT SURGERY  2003, 2004    Both knees replaced   • LAP ADJUSTABLE GASTRIC BAND  2007    Dr. Carmenza Jean Baptiste   • [facility] CLOS WND FACE,FACIAL 5.1-7.5  11/16/2012    Procedure: EXCISION OF FACE/HEAD/NECK LESION;  Surgeon: Location: [facility]   • PATIENT [address] FOR IV ANTIBIOTIC SURGICAL SITE INFECTION PROPHYLAXIS.  Right 2/29/2016    Procedure: RIGHT PHACOEMULSIFICATION OF CATARACT WITH INTRAOCULAR LENS IMPLANT [id number];  Surgeon: Arlyn Finch Restriction: L upper extremity     L Upper Extremity: Non-Weight Bearing          PAIN ASSESSMENT  Ratin  Location: L shoulder  Management Techniques: Activity promotion; Body mechanics;Breathing techniques;Relaxation;Repositioning    COGNITION - spoke not tested  Left Elbow extension  not tested    COORDINATION  Gross Motor    WFL within available range    Fine Motor    WFL     ADDITIONAL TESTS                                    NEUROLOGICAL FINDINGS  Neurological Findings: None                ACTIVITY assist x2 with max safety cues, patient with difficulty following cues for sequencing and safety, unwilling or unable to push from bed with RUE, repeatedly requesting to be pulled up by his arms (later found out from wife this is patient's baseline for sta impairment. Research supports that patients with this level of impairment often benefit from RASHEEDA at discharge.  The patient is below his baseline and would benefit from continued skilled OT to address the activity limitations identified by the AM-PAC \"6 cl Supine with supervision  Patient will transfer to Toilet with supervision    ADDITIONAL GOALS   Patient will recall maintain LUE NWB status during ADL tasks and functional transfers  Patient will perform shoulder HEP with supervision    -------------------

## 2021-09-08 NOTE — PLAN OF CARE
Pt alert X4  . Up in chair all shift. Forgetful & slightly aphasic at times. Very unsteady, POC is to go to [facility].  Pain controlled, no complaints

## 2021-09-09 ENCOUNTER — INITIAL APN SNF VISIT (OUTPATIENT)
Dept: INTERNAL MEDICINE CLINIC | Age: 76
End: 2021-09-09

## 2021-09-09 VITALS
TEMPERATURE: 99 F | DIASTOLIC BLOOD PRESSURE: 67 MMHG | HEART RATE: 77 BPM | BODY MASS INDEX: 27.14 KG/M2 | OXYGEN SATURATION: 95 % | HEIGHT: 76 IN | SYSTOLIC BLOOD PRESSURE: 116 MMHG | WEIGHT: 222.88 LBS | RESPIRATION RATE: 17 BRPM

## 2021-09-09 DIAGNOSIS — E78.00 PURE HYPERCHOLESTEROLEMIA: ICD-10-CM

## 2021-09-09 DIAGNOSIS — Z78.9 DECREASED ACTIVITIES OF DAILY LIVING (ADL): ICD-10-CM

## 2021-09-09 DIAGNOSIS — I10 ESSENTIAL HYPERTENSION: ICD-10-CM

## 2021-09-09 DIAGNOSIS — F32.0 CURRENT MILD EPISODE OF MAJOR DEPRESSIVE DISORDER WITHOUT PRIOR EPISODE (HCC): ICD-10-CM

## 2021-09-09 DIAGNOSIS — R53.1 WEAKNESS: ICD-10-CM

## 2021-09-09 DIAGNOSIS — M75.102 LEFT ROTATOR CUFF TEAR ARTHROPATHY: Primary | ICD-10-CM

## 2021-09-09 DIAGNOSIS — Z86.73 HISTORY OF CVA (CEREBROVASCULAR ACCIDENT): ICD-10-CM

## 2021-09-09 DIAGNOSIS — K59.09 OTHER CONSTIPATION: ICD-10-CM

## 2021-09-09 DIAGNOSIS — M12.812 LEFT ROTATOR CUFF TEAR ARTHROPATHY: Primary | ICD-10-CM

## 2021-09-09 PROCEDURE — 3077F SYST BP >= 140 MM HG: CPT | Performed by: NURSE PRACTITIONER

## 2021-09-09 PROCEDURE — 99310 SBSQ NF CARE HIGH MDM 45: CPT | Performed by: NURSE PRACTITIONER

## 2021-09-09 PROCEDURE — 1124F ACP DISCUSS-NO DSCNMKR DOCD: CPT | Performed by: NURSE PRACTITIONER

## 2021-09-09 PROCEDURE — 97530 THERAPEUTIC ACTIVITIES: CPT

## 2021-09-09 PROCEDURE — 3078F DIAST BP <80 MM HG: CPT | Performed by: NURSE PRACTITIONER

## 2021-09-09 RX ORDER — TRAMADOL HYDROCHLORIDE 50 MG/1
50 TABLET ORAL EVERY 6 HOURS PRN
Qty: 20 TABLET | Refills: 0 | Status: SHIPPED | OUTPATIENT
Start: 2021-09-09 | End: 2021-10-01

## 2021-09-09 RX ORDER — POLYETHYLENE GLYCOL 3350 17 G/17G
17 POWDER, FOR SOLUTION ORAL DAILY PRN
COMMUNITY
End: 2021-10-01

## 2021-09-09 RX ORDER — TRAMADOL HYDROCHLORIDE 50 MG/1
50 TABLET ORAL EVERY 6 HOURS PRN
Qty: 20 TABLET | Refills: 0 | Status: SHIPPED | OUTPATIENT
Start: 2021-09-09 | End: 2021-09-09

## 2021-09-09 RX ORDER — PSEUDOEPHEDRINE HCL 30 MG
100 TABLET ORAL 2 TIMES DAILY
Qty: 1 CAPSULE | Refills: 0
Start: 2021-09-09

## 2021-09-09 RX ORDER — ACETAMINOPHEN 325 MG/1
650 TABLET ORAL EVERY 6 HOURS PRN
COMMUNITY

## 2021-09-09 RX ORDER — POLYETHYLENE GLYCOL 3350 17 G/17G
17 POWDER, FOR SOLUTION ORAL DAILY PRN
Qty: 1 EACH | Refills: 0
Start: 2021-09-09

## 2021-09-09 RX ORDER — TRAMADOL HYDROCHLORIDE 50 MG/1
50 TABLET ORAL EVERY 6 HOURS PRN
Status: DISCONTINUED | OUTPATIENT
Start: 2021-09-09 | End: 2021-09-09

## 2021-09-09 RX ORDER — CLOPIDOGREL BISULFATE 75 MG/1
75 TABLET ORAL DAILY
Qty: 90 TABLET | Refills: 3 | Status: SHIPPED | COMMUNITY
Start: 2021-09-12 | End: 2022-02-03

## 2021-09-09 RX ORDER — PSEUDOEPHEDRINE HCL 30 MG
100 TABLET ORAL 2 TIMES DAILY
Qty: 20 CAPSULE | Refills: 0 | Status: SHIPPED | OUTPATIENT
Start: 2021-09-09 | End: 2021-12-16 | Stop reason: ALTCHOICE

## 2021-09-09 NOTE — PLAN OF CARE
PT HAD EPISODE OF NAUSEA AFTER NORCO THIS AM. TRAMADOL GIVEN WITH NO NAUSEA. PT CLEARED TO GO TO [facility]. REPORT CALLED TO ABDI GRIGSBY.  PT DC'D

## 2021-09-09 NOTE — PROGRESS NOTES
Acute Pain Service  POD#2 s/p L TSA  Nausea with Norco - improved after Zofran  Will trial Tylenol and Tramadol  Plan for dc home later today. Mike Cloud RN    Patient discharged prior to my arrival on floor. Case discussed with nurse.     Robert Fenton

## 2021-09-09 NOTE — OCCUPATIONAL THERAPY NOTE
OCCUPATIONAL THERAPY TREATMENT NOTE - INPATIENT     Room Number: 359/359-A  Session: 1   Number of Visits to Meet Established Goals: 5    Presenting Problem: s/p L reverse TSA 9/7/21    History related to current admission: Patient is 76year old male admi replaced   • LAP ADJUSTABLE GASTRIC BAND  2007    Dr. Les Vanegas   • Nima MARIN WND FACE,FACIAL 5.1-7.5  11/16/2012    Procedure: EXCISION OF FACE/HEAD/NECK LESION;  Surgeon: Magda Melo MD;  Location: [place] [address] ANTIBIOTIC SURGICAL SITE INFECTION PROPHYLAXIS.  Right 2/29/2016    Procedure: RIGHT PHACOEMULSIFICATION OF CATARACT WITH INTRAOCULAR LENS IMPLANT 45751;  Surgeon: Jonah Samuel MD;  Location: [address] [place] [address] tested    Skilled Therapy Provided:  Activities performed this session include: Reinforcement on shoulder precautions, NWB status, and incorporation into ADLs and functional transfers; patient required max cueing to follow throughout session, again trying t training; Compensatory technique education  Rehab Potential : Good  Frequency (Obs): 3-5x/week    All goals in progress 9/9/21  ADL GOALS   Patient will perform Upper Body Dressing with supervision  Patient will perform Lower Body Dressing with mod assist (

## 2021-09-09 NOTE — PLAN OF CARE
A&O x4. VSS on RA. Pain controlled with Norco PRN. Pt up to chair with 2 assist w/walker and gait belt. Pt appears to have some anxiety about ambulating. Voids freely per urinal. Bilateral SCDs.  L shoulder immobilizer in place, surgical drsg C/D/I, gel ice

## 2021-09-09 NOTE — PROGRESS NOTES
Yusef Driscoll  : 1945  Age 76year old  male patient is admitted to Facility: The [address] [place] [address] for subacute rehab.     [address] [place] [address] Drive date:  21  Discharge date to [facility]:  21  ELOS:  14 days  Anticipated discharge jailyn Surgical History:   Procedure Laterality Date   • CATARACT Bilateral 02/2016    Dr. Wojciech Leggett   • COLONOSCOPY N/A 3/12/2021    Procedure: COLONOSCOPY, POSSIBLE BIOPSY, POSSIBLE POLYPECTOMY 77874;  Surgeon:  Jayla Sy MD;  Location: [place] [address] [facility]   • PATIENT [place] PREOPERATIVE ORDER FOR IV ANTIBIOTIC SURGICAL SITE INFECTION PROPHYLAXIS.   10/26/2012    Procedure: EXCISION OF FACE/HEAD/NECK LESION;  Surgeon: Piper Lazar MD;  Location: [place] [address] Code    ADVANCED CARE PLANNING TEAM: None      CURRENT MEDICATIONS   Current Outpatient Medications   Medication Sig Dispense Refill   • docusate sodium 100 MG Oral Cap Take 100 mg by mouth 2 (two) times daily.  20 capsule 0   • traMADol 50 MG Oral Tab Take anxiety  HEMATOLOGY:denies bruising, hx of anemia, on anticoagulants  ENDOCRINE: denies excessive thirst or urination; denies unexpected wt gain or wt loss  ALLERGY/IMM.: denies food or seasonal allergies      PHYSICAL EXAM:  GENERAL HEALTH: well developed [  ]   Diabetes  [  ]  Respiratory failure with ventilator                   [ X ]  Arrhythmia                               [  ]  Dialysis      Hospital score:     Attribute:  [ ]Points if positive:    Low hemoglobin at discharge (<12g/dl) 22.4 08/17/2021       Admit to [facility] labs ordered for 9/10 Not done ordered for 9/13    [address] [facility] records reviewed. Medication reconciliation completed.         SEE PLAN BELOW    Status post left shoulder reverse arthroplasty for rotator cuff t Melony Solis DO [facility]   2/28/2022 11:40 AM MD Natalia Lora [facility]   9/8/2022 10:15 AM MD Veronika Rogers [facility] [address] [facility]       Please note, voice recognition software was used to create this document.  An attempt at proofreading

## 2021-09-09 NOTE — DISCHARGE SUMMARY
Patient ID:  Timothy Mahoney  [id number]  14 year old  12/20/1945    Admit Date: 9/7/2021    Discharge Date and Time: 9/9/2021     Attending Physician: Yudelka Aguillon MD    Reason for admission: Left rotator cuff tear arthropathy [M75.102, M12.812]    Dis MG Oral Cap    HYDROcodone-acetaminophen (NORCO) 5-325 MG Oral Tab    enoxaparin (LOVENOX) 100 MG/ML Subcutaneous Solution        Follow-up with Moshe Andrade MD in 2 weeks.      Melina Ho MD  9/9/2021  6:03 PM

## 2021-09-09 NOTE — PROGRESS NOTES
[facility] Hospitalist Progress Note     PCP: Edie Sanders DO    Chief Complaint: follow-up         SUBJECTIVE:  Sitting in bed, pain manageable. Nausea today.  Discussed with RN Cindy Garcia- pt took norco this AM- didn't take any yesterday. +U, no BM    OBJECTIV limited to PE, afib, depression, GERD, HTN, HL, davin who p/t EH for scheduled shoulder surgery by Dr. Lashell Patino.     # Left rotator cuff tear arthropathy   -S/P LEFT REVERSE SHOULDER ARTHROPLASTY.  Xray reviewed personally- hardware noted  - cont plan per o

## 2021-09-09 NOTE — CM/SW NOTE
Spoke with pt's wife who confirmed they have chosen [facility] for [employer]. Discussed anticipated DC today. Reviewed transportation options and costs for [employer].   Pt's wife will plan to bring clothing for pt to facility after DC and would like to be updated wi

## 2021-09-10 ENCOUNTER — NURSE ONLY (OUTPATIENT)
Dept: LAB | Age: 76
End: 2021-09-10
Attending: FAMILY MEDICINE
Payer: MEDICARE

## 2021-09-10 ENCOUNTER — EXTERNAL FACILITY (OUTPATIENT)
Dept: FAMILY MEDICINE CLINIC | Facility: CLINIC | Age: 76
End: 2021-09-10

## 2021-09-10 VITALS
HEART RATE: 79 BPM | SYSTOLIC BLOOD PRESSURE: 145 MMHG | BODY MASS INDEX: 28 KG/M2 | TEMPERATURE: 97 F | RESPIRATION RATE: 18 BRPM | OXYGEN SATURATION: 96 % | DIASTOLIC BLOOD PRESSURE: 75 MMHG | WEIGHT: 233 LBS

## 2021-09-10 DIAGNOSIS — G47.33 OBSTRUCTIVE SLEEP APNEA: ICD-10-CM

## 2021-09-10 DIAGNOSIS — I10 ESSENTIAL HYPERTENSION: ICD-10-CM

## 2021-09-10 DIAGNOSIS — Z86.73 HISTORY OF STROKE: ICD-10-CM

## 2021-09-10 DIAGNOSIS — E11.9 TYPE 2 DIABETES MELLITUS WITHOUT COMPLICATION, WITHOUT LONG-TERM CURRENT USE OF INSULIN (HCC): ICD-10-CM

## 2021-09-10 DIAGNOSIS — I63.9 CVA (CEREBRAL VASCULAR ACCIDENT) (HCC): Primary | ICD-10-CM

## 2021-09-10 DIAGNOSIS — I48.0 PAF (PAROXYSMAL ATRIAL FIBRILLATION) (HCC): ICD-10-CM

## 2021-09-10 DIAGNOSIS — Z47.89 ORTHOPEDIC AFTERCARE: Primary | ICD-10-CM

## 2021-09-10 DIAGNOSIS — Z96.612 S/P REVERSE TOTAL SHOULDER ARTHROPLASTY, LEFT: ICD-10-CM

## 2021-09-10 LAB
ALBUMIN SERPL-MCNC: 2.6 G/DL (ref 3.4–5)
ALBUMIN/GLOB SERPL: 0.7 {RATIO} (ref 1–2)
ALP LIVER SERPL-CCNC: 64 U/L
ALT SERPL-CCNC: 14 U/L
ANION GAP SERPL CALC-SCNC: 3 MMOL/L (ref 0–18)
AST SERPL-CCNC: 17 U/L (ref 15–37)
BASOPHILS # BLD AUTO: 0.02 X10(3) UL (ref 0–0.2)
BASOPHILS NFR BLD AUTO: 0.2 %
BILIRUB SERPL-MCNC: 0.8 MG/DL (ref 0.1–2)
BUN BLD-MCNC: 11 MG/DL (ref 7–18)
CALCIUM BLD-MCNC: 8.5 MG/DL (ref 8.5–10.1)
CHLORIDE SERPL-SCNC: 113 MMOL/L (ref 98–112)
CO2 SERPL-SCNC: 25 MMOL/L (ref 21–32)
CREAT BLD-MCNC: 0.47 MG/DL
EOSINOPHIL # BLD AUTO: 1.26 X10(3) UL (ref 0–0.7)
EOSINOPHIL NFR BLD AUTO: 12.8 %
ERYTHROCYTE [DISTWIDTH] IN BLOOD BY AUTOMATED COUNT: 13 %
GLOBULIN PLAS-MCNC: 3.5 G/DL (ref 2.8–4.4)
GLUCOSE BLD-MCNC: 122 MG/DL (ref 70–99)
HCT VFR BLD AUTO: 34.5 %
HGB BLD-MCNC: 11 G/DL
IMM GRANULOCYTES # BLD AUTO: 0.02 X10(3) UL (ref 0–1)
IMM GRANULOCYTES NFR BLD: 0.2 %
LYMPHOCYTES # BLD AUTO: 0.63 X10(3) UL (ref 1–4)
LYMPHOCYTES NFR BLD AUTO: 6.4 %
M PROTEIN MFR SERPL ELPH: 6.1 G/DL (ref 6.4–8.2)
MAGNESIUM SERPL-MCNC: 1.9 MG/DL (ref 1.6–2.6)
MCH RBC QN AUTO: 30.6 PG (ref 26–34)
MCHC RBC AUTO-ENTMCNC: 31.9 G/DL (ref 31–37)
MCV RBC AUTO: 95.8 FL
MONOCYTES # BLD AUTO: 0.72 X10(3) UL (ref 0.1–1)
MONOCYTES NFR BLD AUTO: 7.3 %
NEUTROPHILS # BLD AUTO: 7.21 X10 (3) UL (ref 1.5–7.7)
NEUTROPHILS # BLD AUTO: 7.21 X10(3) UL (ref 1.5–7.7)
NEUTROPHILS NFR BLD AUTO: 73.1 %
OSMOLALITY SERPL CALC.SUM OF ELEC: 293 MOSM/KG (ref 275–295)
PATIENT FASTING Y/N/NP: YES
PLATELET # BLD AUTO: 103 10(3)UL (ref 150–450)
POTASSIUM SERPL-SCNC: 4.8 MMOL/L (ref 3.5–5.1)
RBC # BLD AUTO: 3.6 X10(6)UL
SODIUM SERPL-SCNC: 141 MMOL/L (ref 136–145)
TSI SER-ACNC: 0.49 MIU/ML (ref 0.36–3.74)
VIT D+METAB SERPL-MCNC: 31.8 NG/ML (ref 30–100)
WBC # BLD AUTO: 9.9 X10(3) UL (ref 4–11)

## 2021-09-10 PROCEDURE — 82306 VITAMIN D 25 HYDROXY: CPT

## 2021-09-10 PROCEDURE — 83735 ASSAY OF MAGNESIUM: CPT

## 2021-09-10 PROCEDURE — 99306 1ST NF CARE HIGH MDM 50: CPT | Performed by: FAMILY MEDICINE

## 2021-09-10 PROCEDURE — 80053 COMPREHEN METABOLIC PANEL: CPT

## 2021-09-10 PROCEDURE — 84443 ASSAY THYROID STIM HORMONE: CPT

## 2021-09-10 PROCEDURE — 85025 COMPLETE CBC W/AUTO DIFF WBC: CPT

## 2021-09-10 NOTE — PROGRESS NOTES
Timothy Mahoney Author: Hawa Almanzar MD     1945 MRN [id number]   [facility]  Admission 21      Last Hospital Discharge 21 PCP Asad Son, [facility] of Discharge  [facility]        CC --admitted to [facility] at [facility] CATARACT Bilateral 02/2016    Dr. Olman Beckham   • COLONOSCOPY N/A 3/12/2021    Procedure: COLONOSCOPY, POSSIBLE BIOPSY, POSSIBLE POLYPECTOMY 12410;  Surgeon:  Rodney Bales MD;  Location: [address] [place] [address]  9/0/4 ORDER FOR IV ANTIBIOTIC SURGICAL SITE INFECTION PROPHYLAXIS.   10/26/2012    Procedure: EXCISION OF FACE/HEAD/NECK LESION;  Surgeon: Shahid Friedman MD;  Location: [address] [place] [address]   • PATIENT [address] FOR IV ANTIBIOTIC SURGIC Medication Sig Dispense Refill   • [START ON 9/12/2021] clopidogrel 75 MG Oral Tab Take 75 mg by mouth daily. Resume 9/12  90 tablet 3   • docusate sodium 100 MG Oral Cap Take 100 mg by mouth 2 (two) times daily.  20 capsule 0   • traMADol 50 MG Oral Tab is clean dry and intact  HEENT: atraumatic, normocephalic,  EYES: PERRLA, EOMI, conjunctiva are clear  NECK: supple, no adenopathy, no bruits  CHEST: no chest tenderness  LUNGS: clear to auscultation  CARDIO: RRR without murmur  GI: good BS's, no masses, H errors within this office note. If so, please bring any errors to my attention for an addendum.  All efforts were made to ensure that this office note is accurate

## 2021-09-13 ENCOUNTER — NURSE ONLY (OUTPATIENT)
Dept: LAB | Age: 76
End: 2021-09-13
Attending: FAMILY MEDICINE
Payer: MEDICARE

## 2021-09-13 DIAGNOSIS — K21.9 GERD (GASTROESOPHAGEAL REFLUX DISEASE): ICD-10-CM

## 2021-09-13 DIAGNOSIS — I25.10 CORONARY ATHEROSCLEROSIS OF NATIVE CORONARY ARTERY: Primary | ICD-10-CM

## 2021-09-13 DIAGNOSIS — I48.91 ATRIAL FIBRILLATION (HCC): ICD-10-CM

## 2021-09-13 LAB
ALBUMIN SERPL-MCNC: 2.5 G/DL (ref 3.4–5)
ALBUMIN/GLOB SERPL: 0.6 {RATIO} (ref 1–2)
ALP LIVER SERPL-CCNC: 83 U/L
ALT SERPL-CCNC: 24 U/L
ANION GAP SERPL CALC-SCNC: 7 MMOL/L (ref 0–18)
AST SERPL-CCNC: 15 U/L (ref 15–37)
BASOPHILS # BLD AUTO: 0.04 X10(3) UL (ref 0–0.2)
BASOPHILS NFR BLD AUTO: 0.4 %
BILIRUB SERPL-MCNC: 1.4 MG/DL (ref 0.1–2)
BUN BLD-MCNC: 12 MG/DL (ref 7–18)
CALCIUM BLD-MCNC: 8.8 MG/DL (ref 8.5–10.1)
CHLORIDE SERPL-SCNC: 110 MMOL/L (ref 98–112)
CO2 SERPL-SCNC: 23 MMOL/L (ref 21–32)
CREAT BLD-MCNC: 0.49 MG/DL
EOSINOPHIL # BLD AUTO: 1.13 X10(3) UL (ref 0–0.7)
EOSINOPHIL NFR BLD AUTO: 12.1 %
ERYTHROCYTE [DISTWIDTH] IN BLOOD BY AUTOMATED COUNT: 12.7 %
GLOBULIN PLAS-MCNC: 3.9 G/DL (ref 2.8–4.4)
GLUCOSE BLD-MCNC: 121 MG/DL (ref 70–99)
HCT VFR BLD AUTO: 34 %
HGB BLD-MCNC: 11.4 G/DL
IMM GRANULOCYTES # BLD AUTO: 0.04 X10(3) UL (ref 0–1)
IMM GRANULOCYTES NFR BLD: 0.4 %
LYMPHOCYTES # BLD AUTO: 0.8 X10(3) UL (ref 1–4)
LYMPHOCYTES NFR BLD AUTO: 8.5 %
M PROTEIN MFR SERPL ELPH: 6.4 G/DL (ref 6.4–8.2)
MCH RBC QN AUTO: 31.3 PG (ref 26–34)
MCHC RBC AUTO-ENTMCNC: 33.5 G/DL (ref 31–37)
MCV RBC AUTO: 93.4 FL
MONOCYTES # BLD AUTO: 0.81 X10(3) UL (ref 0.1–1)
MONOCYTES NFR BLD AUTO: 8.7 %
NEUTROPHILS # BLD AUTO: 6.54 X10 (3) UL (ref 1.5–7.7)
NEUTROPHILS # BLD AUTO: 6.54 X10(3) UL (ref 1.5–7.7)
NEUTROPHILS NFR BLD AUTO: 69.9 %
OSMOLALITY SERPL CALC.SUM OF ELEC: 291 MOSM/KG (ref 275–295)
PATIENT FASTING Y/N/NP: YES
PLATELET # BLD AUTO: 161 10(3)UL (ref 150–450)
POTASSIUM SERPL-SCNC: 3.4 MMOL/L (ref 3.5–5.1)
RBC # BLD AUTO: 3.64 X10(6)UL
SODIUM SERPL-SCNC: 140 MMOL/L (ref 136–145)
WBC # BLD AUTO: 9.4 X10(3) UL (ref 4–11)

## 2021-09-13 PROCEDURE — 85025 COMPLETE CBC W/AUTO DIFF WBC: CPT

## 2021-09-13 PROCEDURE — 80053 COMPREHEN METABOLIC PANEL: CPT

## 2021-09-13 PROCEDURE — 85027 COMPLETE CBC AUTOMATED: CPT

## 2021-09-13 PROCEDURE — 85007 BL SMEAR W/DIFF WBC COUNT: CPT

## 2021-09-14 ENCOUNTER — SNF VISIT (OUTPATIENT)
Dept: INTERNAL MEDICINE CLINIC | Age: 76
End: 2021-09-14

## 2021-09-14 VITALS
OXYGEN SATURATION: 96 % | TEMPERATURE: 97 F | HEART RATE: 86 BPM | WEIGHT: 233 LBS | RESPIRATION RATE: 18 BRPM | SYSTOLIC BLOOD PRESSURE: 145 MMHG | BODY MASS INDEX: 28 KG/M2 | DIASTOLIC BLOOD PRESSURE: 70 MMHG

## 2021-09-14 DIAGNOSIS — M75.102 LEFT ROTATOR CUFF TEAR ARTHROPATHY: Primary | ICD-10-CM

## 2021-09-14 DIAGNOSIS — Z86.73 HISTORY OF CVA (CEREBROVASCULAR ACCIDENT): ICD-10-CM

## 2021-09-14 DIAGNOSIS — I10 ESSENTIAL HYPERTENSION: ICD-10-CM

## 2021-09-14 DIAGNOSIS — Z78.9 DECREASED ACTIVITIES OF DAILY LIVING (ADL): ICD-10-CM

## 2021-09-14 DIAGNOSIS — R53.1 WEAKNESS: ICD-10-CM

## 2021-09-14 DIAGNOSIS — E78.00 PURE HYPERCHOLESTEROLEMIA: ICD-10-CM

## 2021-09-14 DIAGNOSIS — M12.812 LEFT ROTATOR CUFF TEAR ARTHROPATHY: Primary | ICD-10-CM

## 2021-09-14 PROCEDURE — 99309 SBSQ NF CARE MODERATE MDM 30: CPT | Performed by: NURSE PRACTITIONER

## 2021-09-14 NOTE — PROGRESS NOTES
Florinda De Luna, 12/20/1945, 76year old, male    Chief Complaint:  Patient presents with:   Follow - Up: left total shoulder reverse arthroplasty  Weakness  Pain       HPI:  Juan Jose Conde is a 51-year-old male with previous medical history including history of PE, A. lymphedema  MUSCULOSKELETAL: no acute synovitis upper or lower extremity, CMS intact,  strength equal BUE  EXTREMITIES/VASCULAR:radial pulses 2+ and dorsalis pedal pulses 2+  NEUROLOGIC: A&OX3, no focal deficits, follows commands  PSYCHIATRIC: calm, co Fib  Vital signs every shift and as needed  Apixaban 5 mg p.o. twice daily as at home  Metoprolol 50 mg p.o. twice daily  Atorvastatin 40 mg p.o. daily  Losartan 25 mg p.o. daily  Continue to monitor and titrate as needed  Follow-up with cardiology as dire

## 2021-09-16 ENCOUNTER — SNF VISIT (OUTPATIENT)
Dept: INTERNAL MEDICINE CLINIC | Age: 76
End: 2021-09-16

## 2021-09-16 VITALS
OXYGEN SATURATION: 97 % | BODY MASS INDEX: 28 KG/M2 | WEIGHT: 233 LBS | DIASTOLIC BLOOD PRESSURE: 77 MMHG | SYSTOLIC BLOOD PRESSURE: 131 MMHG | RESPIRATION RATE: 18 BRPM | TEMPERATURE: 97 F | HEART RATE: 94 BPM

## 2021-09-16 DIAGNOSIS — Z78.9 DECREASED ACTIVITIES OF DAILY LIVING (ADL): ICD-10-CM

## 2021-09-16 DIAGNOSIS — M75.102 LEFT ROTATOR CUFF TEAR ARTHROPATHY: Primary | ICD-10-CM

## 2021-09-16 DIAGNOSIS — R53.1 WEAKNESS: ICD-10-CM

## 2021-09-16 DIAGNOSIS — E78.00 PURE HYPERCHOLESTEROLEMIA: ICD-10-CM

## 2021-09-16 DIAGNOSIS — Z86.73 HISTORY OF CVA (CEREBROVASCULAR ACCIDENT): ICD-10-CM

## 2021-09-16 DIAGNOSIS — M12.812 LEFT ROTATOR CUFF TEAR ARTHROPATHY: Primary | ICD-10-CM

## 2021-09-16 DIAGNOSIS — I10 ESSENTIAL HYPERTENSION: ICD-10-CM

## 2021-09-16 PROCEDURE — 99309 SBSQ NF CARE MODERATE MDM 30: CPT | Performed by: NURSE PRACTITIONER

## 2021-09-16 RX ORDER — POTASSIUM CHLORIDE 1500 MG/1
20 TABLET, FILM COATED, EXTENDED RELEASE ORAL
COMMUNITY
End: 2021-12-16 | Stop reason: ALTCHOICE

## 2021-09-16 NOTE — PROGRESS NOTES
Hunter Rojas, 12/20/1945, 76year old, male    Chief Complaint:  Patient presents with:   Follow - Up: left shoulder reverse total arthroplasty  Pain  Weakness       HPI:  Shoaib Bailey is a 77-year-old male with previous medical history including history of PE, A. upper or lower extremity, CMS intact,  strength equal BUE  EXTREMITIES/VASCULAR:radial pulses 2+ and dorsalis pedal pulses 2+  NEUROLOGIC: A&OX3, no focal deficits, follows commands  PSYCHIATRIC: calm, cooperative, mood and affect appropriate to situat cardiology as directed  Plavix on hold until September 14 then resume; resumed today  Aspirin 81 mg p.o. daily while Plavix on hold then DC DCd today     History of CVA  Plavix 75 mg PO daily  Atorvastatin 40 mg PO at bedtime  Follow-up with neurology as n

## 2021-09-20 ENCOUNTER — NURSE ONLY (OUTPATIENT)
Dept: LAB | Age: 76
End: 2021-09-20
Attending: FAMILY MEDICINE
Payer: MEDICARE

## 2021-09-20 DIAGNOSIS — I10 ESSENTIAL HYPERTENSION, MALIGNANT: ICD-10-CM

## 2021-09-20 DIAGNOSIS — I48.91 ATRIAL FIBRILLATION (HCC): Primary | ICD-10-CM

## 2021-09-20 LAB
ALBUMIN SERPL-MCNC: 2.6 G/DL (ref 3.4–5)
ALBUMIN/GLOB SERPL: 0.7 {RATIO} (ref 1–2)
ALP LIVER SERPL-CCNC: 110 U/L
ALT SERPL-CCNC: 34 U/L
ANION GAP SERPL CALC-SCNC: 7 MMOL/L (ref 0–18)
AST SERPL-CCNC: 23 U/L (ref 15–37)
BASOPHILS # BLD AUTO: 0.07 X10(3) UL (ref 0–0.2)
BASOPHILS NFR BLD AUTO: 0.8 %
BILIRUB SERPL-MCNC: 1 MG/DL (ref 0.1–2)
BUN BLD-MCNC: 10 MG/DL (ref 7–18)
CALCIUM BLD-MCNC: 9.1 MG/DL (ref 8.5–10.1)
CHLORIDE SERPL-SCNC: 110 MMOL/L (ref 98–112)
CO2 SERPL-SCNC: 23 MMOL/L (ref 21–32)
CREAT BLD-MCNC: 0.46 MG/DL
EOSINOPHIL # BLD AUTO: 1.03 X10(3) UL (ref 0–0.7)
EOSINOPHIL NFR BLD AUTO: 11.1 %
ERYTHROCYTE [DISTWIDTH] IN BLOOD BY AUTOMATED COUNT: 12.8 %
GLOBULIN PLAS-MCNC: 3.8 G/DL (ref 2.8–4.4)
GLUCOSE BLD-MCNC: 102 MG/DL (ref 70–99)
HCT VFR BLD AUTO: 35.3 %
HGB BLD-MCNC: 11.8 G/DL
IMM GRANULOCYTES # BLD AUTO: 0.04 X10(3) UL (ref 0–1)
IMM GRANULOCYTES NFR BLD: 0.4 %
LYMPHOCYTES # BLD AUTO: 0.98 X10(3) UL (ref 1–4)
LYMPHOCYTES NFR BLD AUTO: 10.6 %
MCH RBC QN AUTO: 31.2 PG (ref 26–34)
MCHC RBC AUTO-ENTMCNC: 33.4 G/DL (ref 31–37)
MCV RBC AUTO: 93.4 FL
MONOCYTES # BLD AUTO: 0.72 X10(3) UL (ref 0.1–1)
MONOCYTES NFR BLD AUTO: 7.8 %
NEUTROPHILS # BLD AUTO: 6.43 X10 (3) UL (ref 1.5–7.7)
NEUTROPHILS # BLD AUTO: 6.43 X10(3) UL (ref 1.5–7.7)
NEUTROPHILS NFR BLD AUTO: 69.3 %
OSMOLALITY SERPL CALC.SUM OF ELEC: 289 MOSM/KG (ref 275–295)
PATIENT FASTING Y/N/NP: YES
PLATELET # BLD AUTO: 212 10(3)UL (ref 150–450)
POTASSIUM SERPL-SCNC: 3.9 MMOL/L (ref 3.5–5.1)
PROT SERPL-MCNC: 6.4 G/DL (ref 6.4–8.2)
RBC # BLD AUTO: 3.78 X10(6)UL
SODIUM SERPL-SCNC: 140 MMOL/L (ref 136–145)
WBC # BLD AUTO: 9.3 X10(3) UL (ref 4–11)

## 2021-09-20 PROCEDURE — 80053 COMPREHEN METABOLIC PANEL: CPT

## 2021-09-20 PROCEDURE — 85025 COMPLETE CBC W/AUTO DIFF WBC: CPT

## 2021-09-21 ENCOUNTER — SNF VISIT (OUTPATIENT)
Dept: INTERNAL MEDICINE CLINIC | Age: 76
End: 2021-09-21

## 2021-09-21 VITALS
TEMPERATURE: 98 F | RESPIRATION RATE: 18 BRPM | BODY MASS INDEX: 28 KG/M2 | WEIGHT: 233 LBS | DIASTOLIC BLOOD PRESSURE: 75 MMHG | HEART RATE: 86 BPM | OXYGEN SATURATION: 99 % | SYSTOLIC BLOOD PRESSURE: 124 MMHG

## 2021-09-21 DIAGNOSIS — Z86.73 HISTORY OF CVA (CEREBROVASCULAR ACCIDENT): ICD-10-CM

## 2021-09-21 DIAGNOSIS — I10 ESSENTIAL HYPERTENSION: ICD-10-CM

## 2021-09-21 DIAGNOSIS — M12.812 LEFT ROTATOR CUFF TEAR ARTHROPATHY: Primary | ICD-10-CM

## 2021-09-21 DIAGNOSIS — E78.00 PURE HYPERCHOLESTEROLEMIA: ICD-10-CM

## 2021-09-21 DIAGNOSIS — Z78.9 DECREASED ACTIVITIES OF DAILY LIVING (ADL): ICD-10-CM

## 2021-09-21 DIAGNOSIS — M75.102 LEFT ROTATOR CUFF TEAR ARTHROPATHY: Primary | ICD-10-CM

## 2021-09-21 DIAGNOSIS — R53.1 WEAKNESS: ICD-10-CM

## 2021-09-21 PROCEDURE — 99308 SBSQ NF CARE LOW MDM 20: CPT | Performed by: NURSE PRACTITIONER

## 2021-09-21 NOTE — PROGRESS NOTES
Sofia Chavez, 12/20/1945, [address], male    Chief Complaint:  Patient presents with:   Follow - Up: left shoulder reverse total arthroplasty  Pain  Weakness       HPI:  Mandy Duran is a 28-year-old male with previous medical history including history of PE, A. tenderness.   :Deferred  LYMPHATIC:no lymphedema  MUSCULOSKELETAL: no acute synovitis upper or lower extremity, CMS intact,  strength equal BUE  EXTREMITIES/VASCULAR:radial pulses 2+ and dorsalis pedal pulses 2+  NEUROLOGIC: A&OX3, no focal deficits, to monitor and titrate as needed  Follow-up with cardiology as directed  Plavix resumed September 14   Aspirin 81 mg p.o. daily while Plavix on hold then DC DCd today     History of CVA  Plavix 75 mg PO daily  Atorvastatin 40 mg PO at bedtime  Follow-up wi

## 2021-09-23 ENCOUNTER — SNF DISCHARGE (OUTPATIENT)
Dept: INTERNAL MEDICINE CLINIC | Age: 76
End: 2021-09-23

## 2021-09-23 VITALS
RESPIRATION RATE: 18 BRPM | SYSTOLIC BLOOD PRESSURE: 135 MMHG | HEART RATE: 90 BPM | BODY MASS INDEX: 26 KG/M2 | OXYGEN SATURATION: 97 % | WEIGHT: 212.38 LBS | DIASTOLIC BLOOD PRESSURE: 86 MMHG | TEMPERATURE: 98 F

## 2021-09-23 DIAGNOSIS — E78.00 PURE HYPERCHOLESTEROLEMIA: ICD-10-CM

## 2021-09-23 DIAGNOSIS — Z86.73 HISTORY OF CVA (CEREBROVASCULAR ACCIDENT): ICD-10-CM

## 2021-09-23 DIAGNOSIS — M12.812 LEFT ROTATOR CUFF TEAR ARTHROPATHY: Primary | ICD-10-CM

## 2021-09-23 DIAGNOSIS — Z78.9 DECREASED ACTIVITIES OF DAILY LIVING (ADL): ICD-10-CM

## 2021-09-23 DIAGNOSIS — I10 ESSENTIAL HYPERTENSION: ICD-10-CM

## 2021-09-23 DIAGNOSIS — K59.09 OTHER CONSTIPATION: ICD-10-CM

## 2021-09-23 DIAGNOSIS — M75.102 LEFT ROTATOR CUFF TEAR ARTHROPATHY: Primary | ICD-10-CM

## 2021-09-23 PROCEDURE — 99316 NF DSCHRG MGMT 30 MIN+: CPT | Performed by: NURSE PRACTITIONER

## 2021-09-23 NOTE — PROGRESS NOTES
Yeimy Eisenberg, 12/20/1945, 76year old, male is being discharged from [address] [place] [address] at [address] [facility]    Date of Admission: 9/9/21    Date of Discharge: 9/24/21                                 Admitting Diagnoses:  Status p moist.  NECK: supple, non tender, FROM  BREAST: deferred  RESPIRATORY:clear, no crackles, no wheezing, no dyspnea, no cough, room air  CARDIOVASCULAR: RRR, S1 and S2, no murmurs, no edema  ABDOMEN:  normal active BS+, soft, nondistended; no organomegaly, n Discharge Diagnoses w/ current management:    Status post left shoulder reverse arthroplasty for rotator cuff tear  Follow-up with  as scheduled  Pain management Tramadol prn, Tylenol prn  IS in use  PT/OT eval and treat  ELOS 14 days Yes     spent in coordination of care in preparation for discharge.     Sukhdev Harper, BRAYAN  9/23/2021  1:25 PM

## 2021-11-24 PROBLEM — M16.12 ARTHRITIS OF LEFT HIP: Status: ACTIVE | Noted: 2021-11-24

## 2021-11-30 PROBLEM — M16.12 PRIMARY OSTEOARTHRITIS OF LEFT HIP: Status: ACTIVE | Noted: 2021-11-24

## 2021-12-29 ENCOUNTER — TELEPHONE (OUTPATIENT)
Dept: MEDSURG UNIT | Facility: HOSPITAL | Age: 76
End: 2021-12-29

## 2022-01-06 PROBLEM — E11.9 TYPE 2 DIABETES MELLITUS WITHOUT COMPLICATION, WITHOUT LONG-TERM CURRENT USE OF INSULIN (HCC): Status: ACTIVE | Noted: 2022-01-06

## 2022-01-06 PROBLEM — I48.0 PAF (PAROXYSMAL ATRIAL FIBRILLATION) (HCC): Status: ACTIVE | Noted: 2022-01-06

## 2022-01-06 PROBLEM — I26.99 ACUTE PULMONARY EMBOLISM, UNSPECIFIED PULMONARY EMBOLISM TYPE, UNSPECIFIED WHETHER ACUTE COR PULMONALE PRESENT (HCC): Status: ACTIVE | Noted: 2022-01-06

## 2022-03-23 RX ORDER — AMOXICILLIN 500 MG
1200 CAPSULE ORAL DAILY
COMMUNITY

## 2022-03-23 RX ORDER — VITAMIN E 268 MG
400 CAPSULE ORAL DAILY
COMMUNITY

## 2022-03-23 RX ORDER — PNV NO.95/FERROUS FUM/FOLIC AC 28MG-0.8MG
1 TABLET ORAL
COMMUNITY

## 2022-04-04 ENCOUNTER — LAB ENCOUNTER (OUTPATIENT)
Dept: LAB | Age: 77
End: 2022-04-04
Attending: ORTHOPAEDIC SURGERY
Payer: MEDICARE

## 2022-04-04 DIAGNOSIS — Z01.812 ENCOUNTER FOR PREOPERATIVE SCREENING LABORATORY TESTING FOR COVID-19 VIRUS: ICD-10-CM

## 2022-04-04 DIAGNOSIS — M16.12 PRIMARY OSTEOARTHRITIS OF LEFT HIP: ICD-10-CM

## 2022-04-04 DIAGNOSIS — Z20.822 ENCOUNTER FOR PREOPERATIVE SCREENING LABORATORY TESTING FOR COVID-19 VIRUS: ICD-10-CM

## 2022-04-04 LAB
ANTIBODY SCREEN: NEGATIVE
RH BLOOD TYPE: POSITIVE

## 2022-04-04 PROCEDURE — 86901 BLOOD TYPING SEROLOGIC RH(D): CPT

## 2022-04-04 PROCEDURE — 86850 RBC ANTIBODY SCREEN: CPT

## 2022-04-04 PROCEDURE — 86900 BLOOD TYPING SEROLOGIC ABO: CPT

## 2022-04-05 LAB — SARS-COV-2 RNA RESP QL NAA+PROBE: NOT DETECTED

## 2022-04-06 ENCOUNTER — ANESTHESIA EVENT (OUTPATIENT)
Dept: SURGERY | Facility: HOSPITAL | Age: 77
End: 2022-04-06
Payer: MEDICARE

## 2022-04-07 ENCOUNTER — APPOINTMENT (OUTPATIENT)
Dept: GENERAL RADIOLOGY | Facility: HOSPITAL | Age: 77
End: 2022-04-07
Attending: ORTHOPAEDIC SURGERY
Payer: MEDICARE

## 2022-04-07 ENCOUNTER — HOSPITAL ENCOUNTER (INPATIENT)
Facility: HOSPITAL | Age: 77
LOS: 2 days | Discharge: HOME HEALTH | End: 2022-04-09
Attending: ORTHOPAEDIC SURGERY | Admitting: ORTHOPAEDIC SURGERY
Payer: MEDICARE

## 2022-04-07 ENCOUNTER — ANESTHESIA (OUTPATIENT)
Dept: SURGERY | Facility: HOSPITAL | Age: 77
End: 2022-04-07
Payer: MEDICARE

## 2022-04-07 DIAGNOSIS — Z01.812 ENCOUNTER FOR PREOPERATIVE SCREENING LABORATORY TESTING FOR COVID-19 VIRUS: Primary | ICD-10-CM

## 2022-04-07 DIAGNOSIS — Z20.822 ENCOUNTER FOR PREOPERATIVE SCREENING LABORATORY TESTING FOR COVID-19 VIRUS: Primary | ICD-10-CM

## 2022-04-07 DIAGNOSIS — M16.12 PRIMARY OSTEOARTHRITIS OF LEFT HIP: ICD-10-CM

## 2022-04-07 PROBLEM — Z11.52 ENCOUNTER FOR PREOPERATIVE SCREENING LABORATORY TESTING FOR COVID-19 VIRUS: Status: ACTIVE | Noted: 2022-04-07

## 2022-04-07 LAB
CREAT BLD-MCNC: 0.84 MG/DL
GLUCOSE BLD-MCNC: 127 MG/DL (ref 70–99)
GLUCOSE BLD-MCNC: 228 MG/DL (ref 70–99)

## 2022-04-07 PROCEDURE — 88304 TISSUE EXAM BY PATHOLOGIST: CPT | Performed by: ORTHOPAEDIC SURGERY

## 2022-04-07 PROCEDURE — 97530 THERAPEUTIC ACTIVITIES: CPT

## 2022-04-07 PROCEDURE — 97161 PT EVAL LOW COMPLEX 20 MIN: CPT

## 2022-04-07 PROCEDURE — 76000 FLUOROSCOPY <1 HR PHYS/QHP: CPT | Performed by: ORTHOPAEDIC SURGERY

## 2022-04-07 PROCEDURE — 0SRB0JA REPLACEMENT OF LEFT HIP JOINT WITH SYNTHETIC SUBSTITUTE, UNCEMENTED, OPEN APPROACH: ICD-10-PCS | Performed by: ORTHOPAEDIC SURGERY

## 2022-04-07 PROCEDURE — 82962 GLUCOSE BLOOD TEST: CPT

## 2022-04-07 PROCEDURE — BQ111ZZ FLUOROSCOPY OF LEFT HIP USING LOW OSMOLAR CONTRAST: ICD-10-PCS | Performed by: ORTHOPAEDIC SURGERY

## 2022-04-07 PROCEDURE — 88311 DECALCIFY TISSUE: CPT | Performed by: ORTHOPAEDIC SURGERY

## 2022-04-07 PROCEDURE — 82565 ASSAY OF CREATININE: CPT | Performed by: PHYSICIAN ASSISTANT

## 2022-04-07 DEVICE — ACTIS DUOFIX HIP PROSTHESIS (FEMORAL STEM 12/14 TAPER CEMENTLESS SIZE 9 STD COLLAR)  CE
Type: IMPLANTABLE DEVICE | Site: HIP | Status: FUNCTIONAL
Brand: ACTIS

## 2022-04-07 DEVICE — PINNACLE HIP SOLUTIONS ALTRX POLYETHYLENE ACETABULAR LINER +4 NEUTRAL 36MM ID 56MM OD
Type: IMPLANTABLE DEVICE | Site: HIP | Status: FUNCTIONAL
Brand: PINNACLE ALTRX

## 2022-04-07 DEVICE — PINNACLE 100 ACETABULAR SHELL GRIPTION 100 56MM OD
Type: IMPLANTABLE DEVICE | Site: HIP | Status: FUNCTIONAL
Brand: PINNACLE GRIPTION

## 2022-04-07 DEVICE — BIOLOX DELTA CERAMIC FEMORAL HEAD +5.0 36MM DIA 12/14 TAPER
Type: IMPLANTABLE DEVICE | Site: HIP | Status: FUNCTIONAL
Brand: BIOLOX DELTA

## 2022-04-07 DEVICE — APEX HOLE ELIMINATOR - PS
Type: IMPLANTABLE DEVICE | Site: HIP | Status: FUNCTIONAL
Brand: APEX

## 2022-04-07 RX ORDER — DIPHENHYDRAMINE HCL 25 MG
25 CAPSULE ORAL EVERY 4 HOURS PRN
Status: DISCONTINUED | OUTPATIENT
Start: 2022-04-07 | End: 2022-04-09

## 2022-04-07 RX ORDER — TRAMADOL HYDROCHLORIDE 50 MG/1
50 TABLET ORAL EVERY 6 HOURS
Status: DISCONTINUED | OUTPATIENT
Start: 2022-04-07 | End: 2022-04-09

## 2022-04-07 RX ORDER — BISACODYL 10 MG
10 SUPPOSITORY, RECTAL RECTAL
Status: DISCONTINUED | OUTPATIENT
Start: 2022-04-07 | End: 2022-04-09

## 2022-04-07 RX ORDER — KETOROLAC TROMETHAMINE 15 MG/ML
15 INJECTION, SOLUTION INTRAMUSCULAR; INTRAVENOUS EVERY 6 HOURS
Status: COMPLETED | OUTPATIENT
Start: 2022-04-07 | End: 2022-04-08

## 2022-04-07 RX ORDER — TRANEXAMIC ACID 10 MG/ML
1000 INJECTION, SOLUTION INTRAVENOUS ONCE
Status: DISCONTINUED | OUTPATIENT
Start: 2022-04-07 | End: 2022-04-07 | Stop reason: HOSPADM

## 2022-04-07 RX ORDER — ATORVASTATIN CALCIUM 40 MG/1
40 TABLET, FILM COATED ORAL NIGHTLY
Status: DISCONTINUED | OUTPATIENT
Start: 2022-04-07 | End: 2022-04-09

## 2022-04-07 RX ORDER — ONDANSETRON 2 MG/ML
4 INJECTION INTRAMUSCULAR; INTRAVENOUS EVERY 4 HOURS PRN
Status: ACTIVE | OUTPATIENT
Start: 2022-04-07 | End: 2022-04-09

## 2022-04-07 RX ORDER — ACETAMINOPHEN 500 MG
1000 TABLET ORAL ONCE AS NEEDED
Status: DISCONTINUED | OUTPATIENT
Start: 2022-04-07 | End: 2022-04-07 | Stop reason: HOSPADM

## 2022-04-07 RX ORDER — ZOLPIDEM TARTRATE 5 MG/1
5 TABLET ORAL NIGHTLY PRN
Status: DISCONTINUED | OUTPATIENT
Start: 2022-04-07 | End: 2022-04-09

## 2022-04-07 RX ORDER — ACETAMINOPHEN 500 MG
1000 TABLET ORAL ONCE
Status: DISCONTINUED | OUTPATIENT
Start: 2022-04-07 | End: 2022-04-07 | Stop reason: HOSPADM

## 2022-04-07 RX ORDER — NICOTINE POLACRILEX 4 MG
30 LOZENGE BUCCAL
Status: DISCONTINUED | OUTPATIENT
Start: 2022-04-07 | End: 2022-04-07 | Stop reason: HOSPADM

## 2022-04-07 RX ORDER — SENNOSIDES 8.6 MG
17.2 TABLET ORAL NIGHTLY
Status: DISCONTINUED | OUTPATIENT
Start: 2022-04-07 | End: 2022-04-09

## 2022-04-07 RX ORDER — CEFAZOLIN SODIUM/WATER 2 G/20 ML
2 SYRINGE (ML) INTRAVENOUS EVERY 8 HOURS
Status: COMPLETED | OUTPATIENT
Start: 2022-04-07 | End: 2022-04-07

## 2022-04-07 RX ORDER — LIDOCAINE HYDROCHLORIDE 10 MG/ML
INJECTION, SOLUTION EPIDURAL; INFILTRATION; INTRACAUDAL; PERINEURAL AS NEEDED
Status: DISCONTINUED | OUTPATIENT
Start: 2022-04-07 | End: 2022-04-07 | Stop reason: SURG

## 2022-04-07 RX ORDER — ESCITALOPRAM OXALATE 20 MG/1
20 TABLET ORAL DAILY
COMMUNITY

## 2022-04-07 RX ORDER — HYDROMORPHONE HYDROCHLORIDE 1 MG/ML
0.4 INJECTION, SOLUTION INTRAMUSCULAR; INTRAVENOUS; SUBCUTANEOUS EVERY 2 HOUR PRN
Status: DISCONTINUED | OUTPATIENT
Start: 2022-04-07 | End: 2022-04-09

## 2022-04-07 RX ORDER — NICOTINE POLACRILEX 4 MG
15 LOZENGE BUCCAL
Status: DISCONTINUED | OUTPATIENT
Start: 2022-04-07 | End: 2022-04-07 | Stop reason: HOSPADM

## 2022-04-07 RX ORDER — ACETAMINOPHEN 325 MG/1
650 TABLET ORAL ONCE
Status: COMPLETED | OUTPATIENT
Start: 2022-04-07 | End: 2022-04-07

## 2022-04-07 RX ORDER — MIDAZOLAM HYDROCHLORIDE 1 MG/ML
INJECTION INTRAMUSCULAR; INTRAVENOUS AS NEEDED
Status: DISCONTINUED | OUTPATIENT
Start: 2022-04-07 | End: 2022-04-07 | Stop reason: SURG

## 2022-04-07 RX ORDER — MIRTAZAPINE 7.5 MG/1
7.5 TABLET, FILM COATED ORAL NIGHTLY
Status: DISCONTINUED | OUTPATIENT
Start: 2022-04-07 | End: 2022-04-09

## 2022-04-07 RX ORDER — OXYCODONE HYDROCHLORIDE 5 MG/1
2.5 TABLET ORAL EVERY 4 HOURS PRN
Status: DISCONTINUED | OUTPATIENT
Start: 2022-04-07 | End: 2022-04-09

## 2022-04-07 RX ORDER — KETAMINE HYDROCHLORIDE 50 MG/ML
INJECTION, SOLUTION, CONCENTRATE INTRAMUSCULAR; INTRAVENOUS AS NEEDED
Status: DISCONTINUED | OUTPATIENT
Start: 2022-04-07 | End: 2022-04-07 | Stop reason: SURG

## 2022-04-07 RX ORDER — CLOPIDOGREL BISULFATE 75 MG/1
75 TABLET ORAL DAILY
Status: DISCONTINUED | OUTPATIENT
Start: 2022-04-07 | End: 2022-04-09

## 2022-04-07 RX ORDER — ASPIRIN 81 MG/1
81 TABLET ORAL DAILY
COMMUNITY
End: 2022-04-09

## 2022-04-07 RX ORDER — LOSARTAN POTASSIUM 25 MG/1
25 TABLET ORAL DAILY
Status: DISCONTINUED | OUTPATIENT
Start: 2022-04-08 | End: 2022-04-09

## 2022-04-07 RX ORDER — DEXAMETHASONE SODIUM PHOSPHATE 4 MG/ML
VIAL (ML) INJECTION AS NEEDED
Status: DISCONTINUED | OUTPATIENT
Start: 2022-04-07 | End: 2022-04-07 | Stop reason: SURG

## 2022-04-07 RX ORDER — ACETAMINOPHEN 325 MG/1
650 TABLET ORAL 4 TIMES DAILY
Status: DISCONTINUED | OUTPATIENT
Start: 2022-04-07 | End: 2022-04-09

## 2022-04-07 RX ORDER — POLYETHYLENE GLYCOL 3350 17 G/17G
17 POWDER, FOR SOLUTION ORAL DAILY PRN
Status: DISCONTINUED | OUTPATIENT
Start: 2022-04-07 | End: 2022-04-09

## 2022-04-07 RX ORDER — METOCLOPRAMIDE HYDROCHLORIDE 5 MG/ML
INJECTION INTRAMUSCULAR; INTRAVENOUS AS NEEDED
Status: DISCONTINUED | OUTPATIENT
Start: 2022-04-07 | End: 2022-04-07 | Stop reason: SURG

## 2022-04-07 RX ORDER — PHENYLEPHRINE HCL 10 MG/ML
VIAL (ML) INJECTION AS NEEDED
Status: DISCONTINUED | OUTPATIENT
Start: 2022-04-07 | End: 2022-04-07 | Stop reason: SURG

## 2022-04-07 RX ORDER — HYDROCODONE BITARTRATE AND ACETAMINOPHEN 5; 325 MG/1; MG/1
2 TABLET ORAL AS NEEDED
Status: DISCONTINUED | OUTPATIENT
Start: 2022-04-07 | End: 2022-04-07 | Stop reason: HOSPADM

## 2022-04-07 RX ORDER — ONDANSETRON 2 MG/ML
4 INJECTION INTRAMUSCULAR; INTRAVENOUS AS NEEDED
Status: DISCONTINUED | OUTPATIENT
Start: 2022-04-07 | End: 2022-04-07 | Stop reason: HOSPADM

## 2022-04-07 RX ORDER — HYDROMORPHONE HYDROCHLORIDE 1 MG/ML
0.4 INJECTION, SOLUTION INTRAMUSCULAR; INTRAVENOUS; SUBCUTANEOUS EVERY 5 MIN PRN
Status: DISCONTINUED | OUTPATIENT
Start: 2022-04-07 | End: 2022-04-07 | Stop reason: HOSPADM

## 2022-04-07 RX ORDER — DEXAMETHASONE SODIUM PHOSPHATE 10 MG/ML
8 INJECTION, SOLUTION INTRAMUSCULAR; INTRAVENOUS ONCE
Status: COMPLETED | OUTPATIENT
Start: 2022-04-08 | End: 2022-04-08

## 2022-04-07 RX ORDER — ONDANSETRON 2 MG/ML
INJECTION INTRAMUSCULAR; INTRAVENOUS AS NEEDED
Status: DISCONTINUED | OUTPATIENT
Start: 2022-04-07 | End: 2022-04-07 | Stop reason: SURG

## 2022-04-07 RX ORDER — HYDROMORPHONE HYDROCHLORIDE 1 MG/ML
0.2 INJECTION, SOLUTION INTRAMUSCULAR; INTRAVENOUS; SUBCUTANEOUS EVERY 2 HOUR PRN
Status: DISCONTINUED | OUTPATIENT
Start: 2022-04-07 | End: 2022-04-09

## 2022-04-07 RX ORDER — CEFAZOLIN SODIUM/WATER 2 G/20 ML
2 SYRINGE (ML) INTRAVENOUS ONCE
Status: COMPLETED | OUTPATIENT
Start: 2022-04-07 | End: 2022-04-07

## 2022-04-07 RX ORDER — DIPHENHYDRAMINE HYDROCHLORIDE 50 MG/ML
25 INJECTION INTRAMUSCULAR; INTRAVENOUS ONCE AS NEEDED
Status: ACTIVE | OUTPATIENT
Start: 2022-04-07 | End: 2022-04-07

## 2022-04-07 RX ORDER — ACETAMINOPHEN 325 MG/1
TABLET ORAL
Status: COMPLETED
Start: 2022-04-07 | End: 2022-04-07

## 2022-04-07 RX ORDER — SODIUM PHOSPHATE, DIBASIC AND SODIUM PHOSPHATE, MONOBASIC 7; 19 G/133ML; G/133ML
1 ENEMA RECTAL ONCE AS NEEDED
Status: DISCONTINUED | OUTPATIENT
Start: 2022-04-07 | End: 2022-04-09

## 2022-04-07 RX ORDER — SODIUM CHLORIDE, SODIUM LACTATE, POTASSIUM CHLORIDE, CALCIUM CHLORIDE 600; 310; 30; 20 MG/100ML; MG/100ML; MG/100ML; MG/100ML
INJECTION, SOLUTION INTRAVENOUS CONTINUOUS
Status: DISCONTINUED | OUTPATIENT
Start: 2022-04-07 | End: 2022-04-07 | Stop reason: HOSPADM

## 2022-04-07 RX ORDER — ESCITALOPRAM OXALATE 20 MG/1
20 TABLET ORAL DAILY
Status: DISCONTINUED | OUTPATIENT
Start: 2022-04-07 | End: 2022-04-09

## 2022-04-07 RX ORDER — MELATONIN
325
Status: DISCONTINUED | OUTPATIENT
Start: 2022-04-07 | End: 2022-04-09

## 2022-04-07 RX ORDER — DEXTROSE MONOHYDRATE 25 G/50ML
50 INJECTION, SOLUTION INTRAVENOUS
Status: DISCONTINUED | OUTPATIENT
Start: 2022-04-07 | End: 2022-04-07 | Stop reason: HOSPADM

## 2022-04-07 RX ORDER — OXYCODONE HYDROCHLORIDE 5 MG/1
5 TABLET ORAL EVERY 4 HOURS PRN
Status: DISCONTINUED | OUTPATIENT
Start: 2022-04-07 | End: 2022-04-09

## 2022-04-07 RX ORDER — SODIUM CHLORIDE 9 MG/ML
INJECTION, SOLUTION INTRAVENOUS CONTINUOUS
Status: DISCONTINUED | OUTPATIENT
Start: 2022-04-07 | End: 2022-04-08

## 2022-04-07 RX ORDER — SODIUM CHLORIDE, SODIUM LACTATE, POTASSIUM CHLORIDE, CALCIUM CHLORIDE 600; 310; 30; 20 MG/100ML; MG/100ML; MG/100ML; MG/100ML
INJECTION, SOLUTION INTRAVENOUS CONTINUOUS
Status: DISCONTINUED | OUTPATIENT
Start: 2022-04-07 | End: 2022-04-08

## 2022-04-07 RX ORDER — MIRTAZAPINE 7.5 MG/1
7.5 TABLET, FILM COATED ORAL NIGHTLY
COMMUNITY

## 2022-04-07 RX ORDER — HYDROCODONE BITARTRATE AND ACETAMINOPHEN 5; 325 MG/1; MG/1
1 TABLET ORAL AS NEEDED
Status: DISCONTINUED | OUTPATIENT
Start: 2022-04-07 | End: 2022-04-07 | Stop reason: HOSPADM

## 2022-04-07 RX ORDER — PROCHLORPERAZINE EDISYLATE 5 MG/ML
10 INJECTION INTRAMUSCULAR; INTRAVENOUS EVERY 6 HOURS PRN
Status: ACTIVE | OUTPATIENT
Start: 2022-04-07 | End: 2022-04-09

## 2022-04-07 RX ORDER — METOPROLOL TARTRATE 50 MG/1
50 TABLET, FILM COATED ORAL 2 TIMES DAILY
Status: DISCONTINUED | OUTPATIENT
Start: 2022-04-07 | End: 2022-04-09

## 2022-04-07 RX ORDER — DIPHENHYDRAMINE HYDROCHLORIDE 50 MG/ML
12.5 INJECTION INTRAMUSCULAR; INTRAVENOUS EVERY 4 HOURS PRN
Status: DISCONTINUED | OUTPATIENT
Start: 2022-04-07 | End: 2022-04-09

## 2022-04-07 RX ORDER — DOCUSATE SODIUM 100 MG/1
100 CAPSULE, LIQUID FILLED ORAL 2 TIMES DAILY
Status: DISCONTINUED | OUTPATIENT
Start: 2022-04-07 | End: 2022-04-09

## 2022-04-07 RX ORDER — NALOXONE HYDROCHLORIDE 0.4 MG/ML
80 INJECTION, SOLUTION INTRAMUSCULAR; INTRAVENOUS; SUBCUTANEOUS AS NEEDED
Status: DISCONTINUED | OUTPATIENT
Start: 2022-04-07 | End: 2022-04-07 | Stop reason: HOSPADM

## 2022-04-07 RX ADMIN — KETAMINE HYDROCHLORIDE 15 MG: 50 INJECTION, SOLUTION, CONCENTRATE INTRAMUSCULAR; INTRAVENOUS at 07:25:00

## 2022-04-07 RX ADMIN — SODIUM CHLORIDE, SODIUM LACTATE, POTASSIUM CHLORIDE, CALCIUM CHLORIDE: 600; 310; 30; 20 INJECTION, SOLUTION INTRAVENOUS at 07:04:00

## 2022-04-07 RX ADMIN — PHENYLEPHRINE HCL 50 MCG: 10 MG/ML VIAL (ML) INJECTION at 08:44:00

## 2022-04-07 RX ADMIN — METOCLOPRAMIDE HYDROCHLORIDE 10 MG: 5 INJECTION INTRAMUSCULAR; INTRAVENOUS at 07:17:00

## 2022-04-07 RX ADMIN — LIDOCAINE HYDROCHLORIDE 50 MG: 10 INJECTION, SOLUTION EPIDURAL; INFILTRATION; INTRACAUDAL; PERINEURAL at 07:04:00

## 2022-04-07 RX ADMIN — CEFAZOLIN SODIUM/WATER 2 G: 2 G/20 ML SYRINGE (ML) INTRAVENOUS at 07:24:00

## 2022-04-07 RX ADMIN — SODIUM CHLORIDE, SODIUM LACTATE, POTASSIUM CHLORIDE, CALCIUM CHLORIDE: 600; 310; 30; 20 INJECTION, SOLUTION INTRAVENOUS at 08:26:00

## 2022-04-07 RX ADMIN — SODIUM CHLORIDE, SODIUM LACTATE, POTASSIUM CHLORIDE, CALCIUM CHLORIDE: 600; 310; 30; 20 INJECTION, SOLUTION INTRAVENOUS at 09:06:00

## 2022-04-07 RX ADMIN — ONDANSETRON 4 MG: 2 INJECTION INTRAMUSCULAR; INTRAVENOUS at 07:17:00

## 2022-04-07 RX ADMIN — MIDAZOLAM HYDROCHLORIDE 2 MG: 1 INJECTION INTRAMUSCULAR; INTRAVENOUS at 07:08:00

## 2022-04-07 RX ADMIN — DEXAMETHASONE SODIUM PHOSPHATE 4 MG: 4 MG/ML VIAL (ML) INJECTION at 07:17:00

## 2022-04-07 NOTE — INTERVAL H&P NOTE
Pre-op Diagnosis: Primary osteoarthritis of left hip [M16.12]    The above referenced H&P was reviewed by Branden Wray MD on 4/7/2022, the patient was examined and no significant changes have occurred in the patient's condition since the H&P was performed. I discussed with the patient and/or legal representative the potential benefits, risks and side effects of this procedure; the likelihood of the patient achieving goals; and potential problems that might occur during recuperation. I discussed reasonable alternatives to the procedure, including risks, benefits and side effects related to the alternatives and risks related to not receiving this procedure. We will proceed with procedure as planned.

## 2022-04-07 NOTE — OPERATIVE REPORT
PATIENT'S NAME: Anson Crigler   ATTENDING PHYSICIAN: Valere Brunner, MD   OPERATING PHYSICIAN: Valere Brunner, MD   PATIENT ACCOUNT#:   [de-identified]    LOCATION:  [address] [place] [address],[address] RECORD #:   [id number]    YOB: 1945  ADMISSION DATE:  4/7/2022           OPERATION DATE:  4/7/2022     OPERATIVE REPORT     PREOPERATIVE DIAGNOSIS:  Left hip primary osteoarthritis. POSTOPERATIVE DIAGNOSIS: Left hip primary osteoarthritis. PROCEDURE PERFORMED:  Left anterior total hip arthroplasty with Actis size 9 standard offset stem, 36 mm ceramic head with +5 neck, size 56 Gription 100 series cup with +4 polyethylene liner. ASSISTANT:  RANI Quintero     ANESTHESIA:  Spinal with regional block. INDICATIONS:  This is a 68 yrs old [place] male who presents with ongoing pain and difficulties with the hip consistent with the above diagnosis with failed conservative care. I reviewed the indications and benefits of the procedure. The patient understood and agreed to proceed. PROCEDURE:  The patient was taken to the operating room and placed in the supine position after administration of spinal anesthesia. A regional block was performed at the lower extremity. Patient was placed on the Lindsay table in appropriate position with both lower extremities and the foot holders with appropriate padding. A timeout was performed confirming the appropriate operative site, the patient received IV antibiotics preoperatively. The hip was prepped and draped in the usual sterile fashion. Preoperative x-rays were obtained using C arm to confirm appropriate pelvis position and obtain a baseline x-ray of the involved hip. A knife was used to make a short longitudinal incision at the anterior aspect of the hip. The Bovie was used for subcutaneous dissection and hemostasis.   Dissection was carried down to the fascia of the tensor musculature and the knife used to incise the fascia in line with the skin incision. A clamp was used with digital palpation to undermine the fascia and bluntly dissected the medial border of the tensor muscle. Blunt dissection was then used to confirm anterior hip capsule. The circumflex vessels of the inferior aspect of the wound were cauterized and divided. After adequate exposure of the capsule the Bovie was used to incise the capsule in line with the femoral neck. Tacking sutures were placed superiorly and inferiorly and the capsule divided gaining adequate exposure to the head neck region. Once adequate release was performed, the saw was used to resect the femoral head at the appropriate level without difficulty. Irricept irrigation was then placed into the wound allowed to sit for 1 minute before evacuating the solution. At this point with tractors were placed anteriorly and posteriorly at the acetabulum and soft tissue dissection was carried out to the acetabular rim was well visualized. The reamers were then used in a sequential fashion up to size 55 mm with good bleeding bone and the trial implant was impacted in place with good fixation. C-arm was used to confirm appropriate alignment of the trial implant and that it was well-seated. The trial cup was removed and the size 56 mm cup was impacted in place with good fixation in good position, again confirmed with C arm. After placing the central hole eliminator the +4 polyethylene liner was placed with good fixation. Attention was turned to the femur. Appropriate soft tissue release was performed to mobilize the femur for adequate access. The leg was placed appropriately in extension external rotation and adduction, the femoral hook was used to elevate the proximal femur appropriately with careful attention to the tension on the retractor.   The cookie cutter was then used to gain lateral access to the canal.  The starting broach followed by the sequential broaches were used up to size 9 which was found to be the appropriate fit, confirming this with C arm, and the broach was left in place for trialing. The calcar reamer was used. A standard offset neck and +5 head were placed and the hip was reduced. The C-arm was then used to obtain AP pelvis and AP hip views which were compared to our preoperative x-rays to confirm appropriate length offset and implant position. Stability was evaluated and found to be appropriate. The hip was then dislocated and the trial implants removed. The stem was placed down the canal and impacted with good fixation. After cleaning the [facility] the final head was placed and impacted with good fixation. The hip was then reduced and final x-ray obtained. Aricept irrigation was again placed in the wound allowed to sit for 1 minute and then the solution evacuated. #2 Orthocord suture was used in a figure-of-eight fashion to close the remaining capsule. Closure was then performed with a #1 Vicryl suture in a running fashion to close the fascia. Subcutaneous tissue was closed with inverted 2-0 Vicryl sutures and the skin was closed with a running subcuticular 3-0 Monocryl suture. A sterile dressing was placed. The patient tolerated the procedure well. There were no complications. Blood loss was approximately 150 ml. The patient was taken to Recovery in stable condition.

## 2022-04-07 NOTE — PROGRESS NOTES
Reviewed indications, side effects of pain medication/narcotics and constipation prevention. Stressed importance of increased fluids/roughage in diet, continued use of stool softeners along with laxatives and suppositories as needed while taking narcotics even when at home. Pt verbalized understanding. Teachback done on ankle pumps and incentive spirometry use 10 times every hour w/a for each. Reviewed spandagrip, dressing changes, showering, any restrictions and cold therapy. Encouraged patient to watch discharge education video tomorrow with his wife. Solicited and answered any questions regarding care.

## 2022-04-07 NOTE — ADDENDUM NOTE
Addendum  created 04/07/22 1007 by Temi Reyna MD    Intraprocedure Event edited, Intraprocedure Staff edited

## 2022-04-07 NOTE — ANESTHESIA PROCEDURE NOTES
Spinal Block  Performed by: Winnie Urbano MD  Authorized by: Winnie Urbano MD       General Information and Staff    Start Time:  4/7/2022 7:10 AM  End Time:  4/7/2022 7:11 AM  Anesthesiologist:  Winnie Urbano MD  Performed by:   Anesthesiologist  Patient Location:  OR  Site identification: surface landmarks    Preanesthetic Checklist: patient identified, IV checked, risks and benefits discussed, monitors and equipment checked, pre-op evaluation, timeout performed, anesthesia consent and sterile technique used      Procedure Details    Patient Position:  Sitting  Prep: ChloraPrep    Monitoring:  Cardiac monitor, heart rate and continuous pulse ox  Approach:  Midline  Location:  L3-4  Injection Technique:  Single-shot    Needle    Needle Type:  Sprotte  Needle Gauge:  24 G  Needle Length:  3.5 in    Assessment    Sensory Level:   Events: clear CSF, CSF aspirated, well tolerated and blood negative     Additional Comments     Smooth pass x1 attempt, pt tolerated well

## 2022-04-08 LAB
HCT VFR BLD AUTO: 31.6 %
HGB BLD-MCNC: 10.8 G/DL

## 2022-04-08 PROCEDURE — 97165 OT EVAL LOW COMPLEX 30 MIN: CPT

## 2022-04-08 PROCEDURE — 85018 HEMOGLOBIN: CPT | Performed by: PHYSICIAN ASSISTANT

## 2022-04-08 PROCEDURE — 97535 SELF CARE MNGMENT TRAINING: CPT

## 2022-04-08 PROCEDURE — 97116 GAIT TRAINING THERAPY: CPT

## 2022-04-08 PROCEDURE — 97530 THERAPEUTIC ACTIVITIES: CPT

## 2022-04-08 PROCEDURE — 85014 HEMATOCRIT: CPT | Performed by: PHYSICIAN ASSISTANT

## 2022-04-08 PROCEDURE — 97110 THERAPEUTIC EXERCISES: CPT

## 2022-04-08 PROCEDURE — 96376 TX/PRO/DX INJ SAME DRUG ADON: CPT

## 2022-04-08 NOTE — PLAN OF CARE
Patient alert and oriented. Aphasia but able to answer most questions appropriately. Denies pain. Reports normal sensation to BLE. Generalized edema noted. Pulses doppler. Aquacel dressing to left hip, CDI upon arrival to unit. 200 ortho Dr. Manuel Cochran paged for saturated/leaking aquacel dressing, stated to reinforce current aquacel until Dr. Pj Rivera sees patient in AM. Aquacel reinforced with gauze and coverlet. Pt worked with PT/OT, up x1 person assist with walker and GB. Bedside commode.  Tolerating regular diet, denies N/V.

## 2022-04-08 NOTE — PROGRESS NOTES
Patient wanting to nap. Reviewed prevention of constipation side effect  from narcotics. Teachback done again on ankle pumps and incentive spriometry use. Reviewed dressing changes and showering. Solicited and answered any questions pt had about care. Pt verbalized understanding.

## 2022-04-08 NOTE — PLAN OF CARE
Post-op day 1. Alert and oriented x4. Vital signs within normal range. Room air. Weight bearing as tolerated. Dressing change completed. Physical and occupational therapy on consult. History of CVA with global aphasia present. Ortho communicated that they would like to monitor one more night given drainage from incision site. Discussed plan of care and goals with patient. Plan is to discharge home with [facility].

## 2022-04-08 NOTE — PLAN OF CARE
Patient A&O X4 on RA. Global aphasia from hx CVA. VSS, /IS/telemetry- a fib. SCDs, xarelto/plavix. Voiding freely via urinal, LBM 4/5. Surgical incision to left hip covered with aquacel and reinforced with gauze/coverlet, c/d/i. Denies n/t. Pulses dopplered. Pain controlled with PO medication. Gel ice as needed. Anterior hip precautions. WBAT. PT/OT. Up min assist with walker. Reminded to use call light. Plan to dc home w/ [facility].     0630: Gauze/coverlet to left hip still c/d/i.

## 2022-04-08 NOTE — CM/SW NOTE
Patient has pre-op arrangements to discharge home with [facility]. SW will continue to follow.      RANDOLPH Crook

## 2022-04-09 VITALS
HEIGHT: 76 IN | SYSTOLIC BLOOD PRESSURE: 122 MMHG | HEART RATE: 82 BPM | WEIGHT: 225 LBS | DIASTOLIC BLOOD PRESSURE: 70 MMHG | TEMPERATURE: 98 F | BODY MASS INDEX: 27.4 KG/M2 | OXYGEN SATURATION: 96 % | RESPIRATION RATE: 18 BRPM

## 2022-04-09 LAB
ERYTHROCYTE [DISTWIDTH] IN BLOOD BY AUTOMATED COUNT: 13.5 %
HCT VFR BLD AUTO: 31.5 %
HGB BLD-MCNC: 10.7 G/DL
MCH RBC QN AUTO: 31.9 PG (ref 26–34)
MCHC RBC AUTO-ENTMCNC: 34 G/DL (ref 31–37)
MCV RBC AUTO: 94 FL
PLATELET # BLD AUTO: 111 10(3)UL (ref 150–450)
RBC # BLD AUTO: 3.35 X10(6)UL
WBC # BLD AUTO: 9 X10(3) UL (ref 4–11)

## 2022-04-09 PROCEDURE — 85027 COMPLETE CBC AUTOMATED: CPT | Performed by: INTERNAL MEDICINE

## 2022-04-09 PROCEDURE — 97116 GAIT TRAINING THERAPY: CPT

## 2022-04-09 PROCEDURE — 97530 THERAPEUTIC ACTIVITIES: CPT

## 2022-04-09 NOTE — PROGRESS NOTES
NURSING DISCHARGE NOTE    Discharged Home via Wheelchair. Accompanied by Spouse  Belongings Taken by patient/family. Reviewed discharge paperwork with patient, reviewed medication, follow up appointments and patient instructions. Patient verbalized understanding.

## 2022-04-09 NOTE — PLAN OF CARE
Pt A/Ox4, VSS, on RA, Afib on tele monitoring with controlled rate. Surgical pain minimal per pt, has adequate relief from scheduled Tylenol and Tramadol. Aquacel dsg to lt hip cdi. PP appreciated with use of doppler. Educated on IS use and ankle exercises. Last bm was 4/5, refused Milk of Mag last night but will take Miralax in AM. Plan is dc home with [place] today.

## 2022-04-09 NOTE — DISCHARGE SUMMARY
Patient ID:  Td Brothers  [id number]  84 year old  12/20/1945    Admit Date: 4/7/2022    Discharge Date and Time: 4/9/2022     Attending Physician: Ky Fernandez MD    Reason for admission: Primary osteoarthritis of left hip [M16.12]  Encounter for preoperative screening laboratory testing for COVID-19 virus    Discharge Diagnoses:   Primary osteoarthritis of left hip [M16.12]  Encounter for preoperative screening laboratory testing for COVID-19 virus    Discharged Condition: stable    Procedures:  Procedure(s):  LEFT TOTAL HIP ARTHROPLASTY, ANTERIOR APPROACH Left    Disposition: To home    Patient Instructions:     Leave Aquacel dressing in place for 1 week from 4/7/2022, then change to coverlet dressing and change daily. Discharge Medication List as of 4/9/2022  2:23 PM    CONTINUE these medications which have CHANGED    rivaroxaban 20 MG Oral Tab  Take 0.5 tablets (10 mg total) by mouth daily with food. Take 10mg daily for 7 days. Resume normal 20mg daily dose 7 days after surgery. , Normal, Disp-30 tablet, R-1      CONTINUE these medications which have NOT CHANGED    escitalopram 20 MG Oral Tab  Take 20 mg by mouth daily. , Historical    mirtazapine 7.5 MG Oral Tab  Take 7.5 mg by mouth nightly., Historical    Omega-3 Fatty Acids (FISH OIL) 1200 MG Oral Cap  Take 1,200 mg by mouth daily. , Historical    Vitamin E 180 MG (400 UNIT) Oral Cap  Take 400 Units by mouth daily. , Historical    Ferrous Sulfate (IRON) 325 (65 Fe) MG Oral Tab  Take 1 tablet by mouth. 2 times per week, Historical    losartan 25 MG Oral Tab  Take 1 tablet (25 mg total) by mouth daily. , Normal, Disp-90 tablet, R-3    metoprolol tartrate (LOPRESSOR) 50 MG Oral Tab  Take 1 tablet (50 mg total) by mouth 2 (two) times daily. , Normal, Disp-180 tablet, R-3    atorvastatin 40 MG Oral Tab  Take 1 tablet (40 mg total) by mouth nightly., Normal, Disp-90 tablet, R-3    acetaminophen 500 MG Oral Tab  Take 1 tablet (500 mg total) by mouth every 6 (six) hours as needed for Pain., Normal, Disp-40 tablet, R-1    Multiple Vitamins-Minerals (MULTIVITAL) Oral Tab  Take 1 tablet by mouth daily. , Historical    clopidogrel 75 MG Oral Tab  Take 1 tablet (75 mg total) by mouth daily. , Normal, Disp-90 tablet, R-3      STOP taking these medications    aspirin 81 MG Oral Tab EC    enoxaparin 100 MG/ML Subcutaneous Solution        Follow-up with Scar Frey MD in 2 weeks.      Sivakumar Ward MD  4/9/2022  4:36 PM

## 2022-04-09 NOTE — PLAN OF CARE
Patient A&Ox4, VSS on room air. POD #2, dressing to hip C/D/I, gel ice applied. Reporting mild-moderate pain to left hip, PO medication given with adequate relief. Ambulating in halls with assistance, WBAT, up in chair for all meals. Plan for patient to DC home today with spouse with [facility]. Plan of care discussed with patient, all questions answered, verbalized understanding.

## (undated) DEVICE — ABDOMINAL PAD: Brand: DERMACEA

## (undated) DEVICE — SCD SLEEVE KNEE HI BLEND

## (undated) DEVICE — SLEEVE KENDALL SCD EXPRESS MED

## (undated) DEVICE — GOWN,SIRUS,FABRIC-REINFORCED,X-LARGE: Brand: MEDLINE

## (undated) DEVICE — STERILE POLYISOPRENE POWDER-FREE SURGICAL GLOVES: Brand: PROTEXIS

## (undated) DEVICE — ANTERIOR HIP: Brand: MEDLINE INDUSTRIES, INC.

## (undated) DEVICE — WRAP COOLING SHLDR W/ICE PILLO

## (undated) DEVICE — ARTHX PSTNR IMPL 2.8MM PIN NIT

## (undated) DEVICE — PIN KIT

## (undated) DEVICE — CERAMIC HEAD UPCHARGE: Type: IMPLANTABLE DEVICE

## (undated) DEVICE — PT SPECIFIC PLAN GLEN MODEL 3D

## (undated) DEVICE — Device

## (undated) DEVICE — DRAPE,U/SHT,SPLIT,FILM,60X84,STERILE: Brand: MEDLINE

## (undated) DEVICE — Device: Brand: BOOT LINER, DISPOSABLE

## (undated) DEVICE — CAP CODE: REVERSE + AUGMENT: Type: IMPLANTABLE DEVICE | Site: SHOULDER

## (undated) DEVICE — SHEET,DRAPE,70X100,STERILE: Brand: MEDLINE

## (undated) DEVICE — VIOLET BRAIDED (POLYGLACTIN 910), SYNTHETIC ABSORBABLE SUTURE: Brand: COATED VICRYL

## (undated) DEVICE — SOLUTION  .9 3000ML

## (undated) DEVICE — WRAP COOLING HIP W/ICE PILLOW

## (undated) DEVICE — SUTURE NABSB OTHCRD 2 OS-6

## (undated) DEVICE — 3M™ IOBAN™ 2 ANTIMICROBIAL INCISE DRAPE 6650EZ: Brand: IOBAN™ 2

## (undated) DEVICE — SUTURE ETHIBOND 5 V-37

## (undated) DEVICE — BLADE ELECTRODE: Brand: EDGE

## (undated) DEVICE — HOOD, PEEL-AWAY: Brand: FLYTE

## (undated) DEVICE — Device: Brand: STABLECUT®

## (undated) DEVICE — LIGHT HANDLE

## (undated) DEVICE — HOOD: Brand: FLYTE

## (undated) DEVICE — KIT TRC TRIMANO BEACH CHR ARM

## (undated) DEVICE — TOTAL HIP W/POR CUP & COCR HD: Type: IMPLANTABLE DEVICE

## (undated) DEVICE — 450 ML BOTTLE OF 0.05% CHLORHEXIDINE GLUCONATE IN 99.95% STERILE WATER FOR IRRIGATION, USP AND APPLICATOR.: Brand: IRRISEPT ANTIMICROBIAL WOUND LAVAGE

## (undated) DEVICE — SUTURE ABSORB STRAT 23CML 26MM

## (undated) DEVICE — SYRINGE 30ML LL TIP

## (undated) DEVICE — SUTURE FIBERWIRE 2 AR-7202

## (undated) DEVICE — STERILE POLYISOPRENE POWDER-FREE SURGICAL GLOVES WITH EMOLLIENT COATING: Brand: PROTEXIS

## (undated) DEVICE — STERILE SYNTHETIC POLYISOPRENE POWDER-FREE SURGICAL GLOVES WITH HYDROGEL COATING, SMOOTH FINISH, STRAIGHT FINGER: Brand: PROTEXIS

## (undated) DEVICE — SUTURE CHROMIC GUT 0 CT-1

## (undated) DEVICE — PILLOW ABDUCTION HIP SM

## (undated) DEVICE — BIPOLAR SEALER 23-112-1 AQM 6.0: Brand: AQUAMANTYS™

## (undated) DEVICE — ORTHO CDS-LF: Brand: MEDLINE INDUSTRIES, INC.

## (undated) DEVICE — 3M™ MICROFOAM™ TAPE 1528-4: Brand: 3M™ MICROFOAM™

## (undated) DEVICE — SPONGE RAYTEC 4X4 RF DETECT

## (undated) DEVICE — SUPER SPONGES,MEDIUM: Brand: KERLIX

## (undated) DEVICE — UNIVERS REVERS DRILL SURG 3MM

## (undated) DEVICE — NEEDLE SPINAL 18X3-1/2 PINK.

## (undated) DEVICE — PROXIMATE SKIN STAPLERS (35 WIDE) CONTAINS 35 STAINLESS STEEL STAPLES (FIXED HEAD): Brand: PROXIMATE

## (undated) DEVICE — ADHESIVE MASTISOL 2/3ML

## (undated) DEVICE — CHLORAPREP 26ML APPLICATOR

## (undated) DEVICE — CAUTERY TIP BLADE EXTENSION

## (undated) DEVICE — SUTURE VICRYL 2-0 FSL

## (undated) DEVICE — POSITIONER OR KT PT CR

## (undated) DEVICE — HEAD REAMER SMALL ANGLE DISP

## (undated) DEVICE — HANDPIECE SET WITH HIGH FLOW TIP AND SUCTION TUBE: Brand: INTERPULSE

## (undated) DEVICE — DRESS WOUND AQUACEL 3.5INX10IN

## (undated) DEVICE — SOL  .9 1000ML BTL

## (undated) DEVICE — 3M™ STERI-STRIP™ REINFORCED ADHESIVE SKIN CLOSURES, R1547, 1/2 IN X 4 IN (12 MM X 100 MM), 6 STRIPS/ENVELOPE: Brand: 3M™ STERI-STRIP™

## (undated) NOTE — IP AVS SNAPSHOT
[address]            (For Outpatient Use Only) Initial Admit Date: 9/7/2021   Inpt/Obs Admit Date: Inpt: N/A / Obs: N/A   Discharge Date:    Hospital Acct:  [de-identified]   MRN: [de-identified]   CSN: [id number]   CEID: [id number]        EN Payor:  Plan:    Group Number:  Insurance Type:    Subscriber Name:  Subscriber :    Subscriber ID:  Pt Rel to Subscriber:    Hospital Account Financial Class: Medicare    2021

## (undated) NOTE — LETTER
OUTSIDE TESTING RESULT REQUEST     IMPORTANT: FOR YOUR IMMEDIATE ATTENTION  Please FAX all test results listed below to: [phone]     Testing already done on or about: Pt has an appointment on 3/28/2022     * * * * If testing is NOT complete, arrange with patient A.S.A.P. * * * *      Patient Name: Ruby Peguero  Surgery Date: 2022  CSN: [id number]  Medical Record: [id number]   : 1945 - A: 68 y      Sex: male  Surgeon(s):  Ponce Dye MD  Procedure: LEFT ANTERIOR TOTAL HIP ARTHROPLASTY  Anesthesia Type: Spinal     Surgeon: Ponce Dye MD     The following Testing and Time Line are REQUIRED PER ANESTHESIA     EKG READ AND SIGNED WITHIN   90 days  MSSA/MRSA Nasal screening within 30 days      Thank You,   Sent by:Violette Allan  5/13/15

## (undated) NOTE — Clinical Note
Mr Arty Baumgarten will discharge home from subacute rehab at the [facility] on 9/24. [facility] has been arranged through [facility] and he will follow up with Ortho in 2 weeks.     Thank you    Claudio Lopez

## (undated) NOTE — LETTER
Patient Name: Billy Doyle CSN: [id number]  -Age / Sex: 1945-A: 68 y  male Medical Records: [id number]    ABNORMAL VALUES  Surgeon(s):  Filipe Mahajan MD  Anesthesia Type: Spinal  Procedure Description: LEFT ANTERIOR TOTAL HIP ARTHROPLASTY  Primary Surgeon:  Filipe Mahajan MD  Phone Number: [phone]    PLEASE NOTE THE FOLLOWING ABNORMALITIES:   Other ; Abnormal Nasal Culture; +MSSA report attached ________________________________________________________  Please initiate +MSSA protocol

## (undated) NOTE — IP AVS SNAPSHOT
Patient Demographics     Address  [address] [place] [id number] Phone  [phone] (Home) *Preferred*  [phone] [facility]) E-mail Address  [email or web address]      Emergency Contact(s)     Name Relation Home Work Tommy fingers when you are walking around     Also a pillow behind the elbow when sitting or lying down helps you to keep your arm in the best position so that the surgical repair isn’t damaged.        Dr. Ferny Laboy:  Total Shoulder: discharge instructions disrupt your sleep or activities like getting out of bed or walking. • As you have less discomfort, decrease the amount of pain medication you take. Use plain Tylenol for less severe pain.   • Use ice therapy or polar care to surgical site for 20 minutes a Colace or Senokot while on narcotics, and laxatives if needed. Eat fiber rich foods and drink plenty of fluids, water is a choice liquid.   • An enema may be needed if above measures do not work    Prevention of infection and promotion of healing  • Good ha surgeon or PA (physician’s assistant). This person will be in contact with you. •  Please contact Dr. Reddy Freeman before you come to the ER regarding the incisions appearance, Contact number is [phone].     Signs of blood clot  • Pain, excessive ten ULTRAM      Take 1 tablet (50 mg total) by mouth every 6 (six) hours as needed.    Nayla Shepherd MD               Where to Get Your Medications      Please  your prescriptions at the location directed by your doctor or nurse    Bring a paper Type: Physician    Filed: 9/1/2021  5:24 PM Date of Service: 9/1/2021  5:23 PM Status: Signed    : Delmis Garza MD (Physician)       [facility]  Orthopedic Surgery  HISTORY AND PHYSICAL EXAMINATION    Patient: Sofia Chavez  [facility] • High cholesterol    • HYPERTENSION    • KIDNEY STONE    • Osteoarthritis     left shoulder, bilateral hands   • Stroke ([facility] [address] 9/2011   • Unspecified sleep apnea     after weight loss no longer needed, has not use cpap for several years.    • Visual impairm rotator cuff tear arthropathy  (primary encounter diagnosis)  Schedule for elective left reverse shoulder arthroplasty. Limitations, risks, complications are discussed.   Possibility of continued pain, stiffness, vascular compromise, incomplete resolution cholesterol    • HYPERTENSION    • KIDNEY STONE    • Osteoarthritis     left shoulder, bilateral hands   • Stroke ([facility] [address] 9/2011   • Unspecified sleep apnea     after weight loss no longer needed, has not use cpap for several years.    • Visual impairment Melany Perez MD;  Location: [address] [place] [address]   • PATIENT DOCUMENTED NOT TO HAVE EXPERIENCED ANY OF THE FOLLOWING EVENTS Right 2/29/2016    Procedure: RIGHT PHACOEMULSIFICATION OF CATARACT WITH INTRAOCULAR LENS IMPLANT 16243;  Surgeon: Shayla Barclay Types: Cigarettes        Quit date: 5        Years since quittin.7      Smokeless tobacco: Never Used    Alcohol use: No       Fam Hx  Family History   Problem Relation Age of Onset   • Heart Disorder Father    • Heart Disorder Mother    • Hyperten PA and lateral chest radiographs on 8/17/2021 1:45 PM COMPARISON: None. FINDINGS: Cardiomediastinal silhouette: Cardiomediastinal silhouette is unremarkable. Pulmonary vasculature is normal. Lungs: The lungs are clear bilaterally.  No focal consolidation bi units. A cyst with proteinaceous material also cannot be entirely excluded. Further evaluation with an ultrasound would be more definitive. No right-sided parotid masses are identified.  Vascular Structures: Mild atherosclerotic changes of the cavernous int / PLAN:    76year old male with PMH including but not limited to PE, afib, depression, GERD, HTN, HL, davin who p/t EH for scheduled shoulder surgery by Dr. Miguelito Ho.     # Left rotator cuff tear arthropathy   -S/P LEFT REVERSE SHOULDER ARTHROPLASTY  - con Mela Aden MD on 9/7/2021  1:39 PM  CY. 2 - Mela Aden MD on 9/7/2021  2:04 PM  CY. 3 - Mela Aden MD on 9/7/2021  1:53 PM                     D/C Summary    No notes of this type exist for this encounter.         Physical Therap CATARACT Bilateral 02/2016    Dr. Gloria Fan   • COLONOSCOPY N/A 3/12/2021    Procedure: COLONOSCOPY, POSSIBLE BIOPSY, POSSIBLE POLYPECTOMY 20123;  Surgeon:  Veto Chaney MD;  Location: [address] [place] [address]  6/0/2 ORDER FOR IV ANTIBIOTIC SURGICAL SITE INFECTION PROPHYLAXIS.   10/26/2012    Procedure: EXCISION OF FACE/HEAD/NECK LESION;  Surgeon: Simone Frederick MD;  Location: [address] [place] [address]   • PATIENT [address] FOR IV ANTIBIOTIC SURGIC has B AFOs that he only uses when in the community. Pt drives using his L foot and an adaptive pedal. Pt is able to sleep in his recliner on the first floor and there is a half bath on the first floor, if needed.      SUBJECTIVE  \"This is the first time I' Monitor     BP: 107/60  BP Location: Right arm  BP Method: Automatic  Patient Position: Sitting[EH.2]    O2 WALK[EH.1]  Oxygen Therapy  SPO2% on Room Air at Rest: 96  SPO2% Ambulation on Room Air: 70[XM.4]    AM-PAC '6-Clicks' INPATIENT SHORT FORM - BASIC requiring min assist to control descent. Pt short of breath after ambulation, SPO2 stable on room air. Ended session with pt up in chair, alarm set, needs met, and questions answered.      Exercise/Education Provided:  Energy conservation  Gypsy Simpson supine - sit EOB @ level: supervision     Goal #2 Patient is able to demonstrate transfers Sit to/from Stand at assistance level: minimum assistance     Goal #3 Patient is able to ambulate 150 feet with assist device: LRAD  at assistance level: supervision rotator cuff tear arthropathy[BW.2]      Past Medical History[BW.1]  Past Medical History:   Diagnosis Date   • Acute pulmonary embolus ([address] 06/2021   • Arrhythmia     Afib   • Cancer [facility])     melanoma surgical removal from head   • DEPRESSION    • Depre TO HAVE EXPERIENCED ANY OF THE FOLLOWING EVENTS  11/16/2012    Procedure: EXCISION OF FACE/HEAD/NECK LESION;  Surgeon: Yoly Castañeda MD;  Location: [address] [place] [address]   • PATIENT DOCUMENTED NOT TO HAVE EXPERIENCED ANY OF THE FOLLOWING EVENTS Agatha Eckert PHACOEMULSIFICATION OF CATARACT WITH INTRAOCULAR LENS IMPLANT 28702;  Surgeon: Vannessa Edwards MD;  Location: [address] [place] [address] SITUATION[BW.1]  Type of Home: House  Home Layout: Two level Attention Span:  attends with cues to redirect  Orientation Level:  oriented to situation, oriented to person, disoriented to time and knew in hospital but not which one  Memory:  impaired working memory, decreased recall of biographical information, decre ACTIVITIES OF DAILY LIVING ASSESSMENT  AM-PAC ‘6-Clicks’ Inpatient Daily Activity Short Form  How much help from another person does the patient currently need…[BW.1]  -   Putting on and taking off regular lower body clothing?: A Lot  -   Bathing (includi mod assist for balance in standing; several steps to bedside chair via min-mod assist x2 for safety with max cueing for sequencing and safety; sitting via max assist to control descent; UB dressing max assist with greatly increased time and max cueing for should achieve supervision to Mod I level in all BADLs and functional transfers[BW.5] with exception of baseline assist for LB dressing to knees and standing from recliner.[BW.2]      The patient is functioning below his previous functional level and would ---------------------------------------------------------------------------------------------------------  PPE worn by therapist this session: Surgical mask, goggles, gloves  PPE worn by patient this session: None[BW.1]       Attribution Beckwith    BW.1 - Alejandra Main DEPRESSION    • Depression    • Esophageal reflux     distended esophagus   • High blood pressure    • High cholesterol    • HYPERTENSION    • KIDNEY STONE    • Osteoarthritis     left shoulder, bilateral hands   • Stroke ([facility] 9/2011   • Unspecified sleep THE FOLLOWING EVENTS Left 2/15/2016    Procedure: LEFT PHACOEMULSIFICATION OF CATARACT WITH INTRAOCULAR LENS IMPLANT 19831;  Surgeon: Tamra Oppenheim, MD;  Location: [address] [place] [address]   • PATIENT DOCUMENTED NOT TO HAVE EXPERIENCED ANY OF THE FOLLOWIN Two level  Lives With: Spouse    Toilet and Equipment: Standard height toilet  Shower/Tub and Equipment: Tub-shower combo;Grab bar  Other Equipment: None    Occupation/Status: retired  Hand Dominance:  (R-handed, but reports uses L frequently due to old CV decreased recall of precautions, decreased long term memory and decreased short term memory  Following Commands:  follows one-step commands inconsistently, follows one step commands with increased time and follows one step commands with repetition  Safety (including washing, rinsing, drying)?: A Lot  -   Toileting, which includes using toilet, bedpan or urinal? : Total  -   Putting on and taking off regular upper body clothing?: A Lot  -   Taking care of personal grooming such as brushing teeth?: A Lot  - cueing for L shoulder precautions, total assist for immobilizer management.  Patient also educated on OT role, safety, fall prevention, pain management with fair verbal understanding, will require ongoing reinforcement[BW.4]  Patient End of Session: Up in c level and would benefit from skilled inpatient OT to address the above deficits, maximizing patient’s ability to return safely to his prior level of function.     Patient Complexity  Occupational Profile/Medical History MODERATE - Expanded review of history 9/8/2021 11:11 AM  BW.2 - Stephan Sanchez, OT on 9/8/2021  2:29 PM  BW.3 - Stephan Sanchez OT on 9/8/2021  2:54 PM  BW.4 - Stephan Sanchez OT on 9/8/2021  1:26 PM               Occupational Therapy Note signed by Stephan Sanchez OT at 9/8/20 History:   Procedure Laterality Date   • CATARACT Bilateral 02/2016    Dr. Paty Hanson   • COLONOSCOPY N/A 3/12/2021    Procedure: COLONOSCOPY, POSSIBLE BIOPSY, POSSIBLE POLYPECTOMY 63042;  Surgeon:  Phoebe Gonzalez MD;  Location: [address] [place] [address] [facility]   • [facility] IV ANTIBIOTIC SURGICAL SITE INFECTION PROPHYLAXIS.   10/26/2012    Procedure: EXCISION OF FACE/HEAD/NECK LESION;  Surgeon: Farhan Mendosa MD;  Location: [address] [place] [address] patient's baseline level of function. Per wife, patient typically supervision to Mod I for all functional transfers and BADLs, functional mobility without AE in home, cane occasionally in community.  Patient did require assist from wife for LB dressing to k and decreased awareness of errors   Awareness of Deficits:  decreased awareness of deficits  Problem Solving:  assistance required to generate solutions    Communication:[BW.1] Speaking off-topic, repeating self frequently, frequent redirection to task req ASSESSMENT[BW.1]  Supine to Sit : Moderate assistance (HOB elevated, reports can sleep in recliner)  Sit to Stand: Dependent assistance (mod x2)[BW.2]    Skilled Therapy Provided:[BW.1] Activities performed this session include: Education on shoulder preca old[BW.2]  male admitted 9/7/2021 for L reverse TSA. Complete medical history and occupational profile noted above. Functional outcome measures completed include: AM-PAC \"6 Clicks\".  In this OT evaluation patient presents with the following performance de Comorbidities and min to mod modifications of tasks    Clinical Decision Making MODERATE - Analysis of occupational profile, detailed assessments, several treatment options    Overall Complexity MODERATE[BW.1]     OT Discharge Recommendations: Sub-acute re 09/26/18     INFLUENZA 11/01/17     INFLUENZA 09/26/17     INFLUENZA 09/26/17     INFLUENZA 09/26/16     INFLUENZA 09/26/16     INFLUENZA 09/24/15     INFLUENZA 09/24/15     INFLUENZA 09/17/14     INFLUENZA 09/17/14     INFLUENZA 09/24/13     INFLUENZA 08/